# Patient Record
Sex: FEMALE | Race: WHITE | Employment: PART TIME | URBAN - METROPOLITAN AREA
[De-identification: names, ages, dates, MRNs, and addresses within clinical notes are randomized per-mention and may not be internally consistent; named-entity substitution may affect disease eponyms.]

---

## 2017-05-10 ENCOUNTER — HOSPITAL ENCOUNTER (OUTPATIENT)
Dept: OTHER | Age: 27
Discharge: HOME | End: 2017-05-31
Attending: OBSTETRICS & GYNECOLOGY | Admitting: OBSTETRICS & GYNECOLOGY

## 2017-05-22 ENCOUNTER — ROUTINE PRENATAL (OUTPATIENT)
Dept: PERINATAL CARE | Age: 27
End: 2017-05-22

## 2017-05-22 DIAGNOSIS — Z36.89 ENCOUNTER FOR FETAL ANATOMIC SURVEY: ICD-10-CM

## 2017-05-22 DIAGNOSIS — O99.212 OBESITY COMPLICATING PREGNANCY IN SECOND TRIMESTER: ICD-10-CM

## 2017-08-15 ENCOUNTER — ROUTINE PRENATAL (OUTPATIENT)
Dept: PERINATAL CARE | Age: 27
End: 2017-08-15

## 2017-08-15 DIAGNOSIS — O99.213 OBESITY COMPLICATING PREGNANCY IN THIRD TRIMESTER: Primary | ICD-10-CM

## 2017-08-28 ENCOUNTER — ROUTINE PRENATAL (OUTPATIENT)
Dept: PERINATAL CARE | Age: 27
End: 2017-08-28

## 2017-08-28 DIAGNOSIS — O99.213 OBESITY COMPLICATING PREGNANCY IN THIRD TRIMESTER: Primary | ICD-10-CM

## 2017-09-11 ENCOUNTER — TELEPHONE (OUTPATIENT)
Dept: PERINATAL CARE | Age: 27
End: 2017-09-11

## 2017-10-04 PROBLEM — Z98.891 PREVIOUS CESAREAN SECTION: Status: ACTIVE | Noted: 2017-10-04

## 2017-10-04 PROBLEM — Z98.891 S/P CESAREAN SECTION: Status: ACTIVE | Noted: 2017-10-04

## 2018-09-21 ENCOUNTER — HOSPITAL ENCOUNTER (EMERGENCY)
Age: 28
Discharge: HOME | End: 2018-09-21
Attending: EMERGENCY MEDICINE
Payer: COMMERCIAL

## 2018-09-21 ENCOUNTER — APPOINTMENT (OUTPATIENT)
Dept: GENERAL RADIOLOGY | Age: 28
End: 2018-09-21
Payer: COMMERCIAL

## 2018-09-21 VITALS
HEART RATE: 73 BPM | TEMPERATURE: 99.1 F | SYSTOLIC BLOOD PRESSURE: 124 MMHG | BODY MASS INDEX: 37.5 KG/M2 | WEIGHT: 186 LBS | OXYGEN SATURATION: 100 % | DIASTOLIC BLOOD PRESSURE: 75 MMHG | RESPIRATION RATE: 17 BRPM | HEIGHT: 59 IN

## 2018-09-21 DIAGNOSIS — M25.531 RIGHT WRIST PAIN: Primary | ICD-10-CM

## 2018-09-21 PROCEDURE — 99283 EMERGENCY DEPT VISIT LOW MDM: CPT

## 2018-09-21 PROCEDURE — 73110 X-RAY EXAM OF WRIST: CPT

## 2018-09-21 PROCEDURE — 4500000023 HC ED LEVEL 3 PROCEDURE

## 2018-09-21 RX ORDER — IBUPROFEN 600 MG/1
600 TABLET ORAL EVERY 6 HOURS PRN
Qty: 30 TABLET | Refills: 0 | Status: SHIPPED | OUTPATIENT
Start: 2018-09-21 | End: 2019-01-28

## 2018-09-21 ASSESSMENT — PAIN DESCRIPTION - LOCATION: LOCATION: WRIST

## 2018-09-21 ASSESSMENT — PAIN DESCRIPTION - PAIN TYPE: TYPE: ACUTE PAIN

## 2018-09-21 ASSESSMENT — PAIN SCALES - GENERAL: PAINLEVEL_OUTOF10: 6

## 2018-09-21 ASSESSMENT — PAIN DESCRIPTION - ORIENTATION: ORIENTATION: RIGHT

## 2018-09-21 NOTE — ED PROVIDER NOTES
Final Impression      1.  Right wrist pain        (Please note that portions of this note may have been completed with a voice recognition program. Efforts were made to edit the dictations but occasionally words are mis-transcribed.)    Carlee Vazquez, [address] [place] [address],   09/21/18 5559

## 2018-09-22 ENCOUNTER — APPOINTMENT (OUTPATIENT)
Dept: GENERAL RADIOLOGY | Age: 28
End: 2018-09-22
Payer: COMMERCIAL

## 2018-09-22 ENCOUNTER — HOSPITAL ENCOUNTER (EMERGENCY)
Age: 28
Discharge: HOME | End: 2018-09-22
Attending: EMERGENCY MEDICINE
Payer: COMMERCIAL

## 2018-09-22 VITALS
RESPIRATION RATE: 24 BRPM | TEMPERATURE: 97.9 F | BODY MASS INDEX: 37.5 KG/M2 | OXYGEN SATURATION: 99 % | WEIGHT: 186 LBS | SYSTOLIC BLOOD PRESSURE: 129 MMHG | HEART RATE: 81 BPM | DIASTOLIC BLOOD PRESSURE: 84 MMHG | HEIGHT: 59 IN

## 2018-09-22 DIAGNOSIS — S63.501A SPRAIN OF RIGHT WRIST, INITIAL ENCOUNTER: Primary | ICD-10-CM

## 2018-09-22 DIAGNOSIS — S60.221A CONTUSION OF RIGHT HAND, INITIAL ENCOUNTER: ICD-10-CM

## 2018-09-22 PROCEDURE — 73130 X-RAY EXAM OF HAND: CPT

## 2018-09-22 PROCEDURE — L3809 WHFO W/O JOINTS PRE OTS: HCPCS

## 2018-09-22 PROCEDURE — 99283 EMERGENCY DEPT VISIT LOW MDM: CPT

## 2018-09-22 PROCEDURE — 73110 X-RAY EXAM OF WRIST: CPT

## 2018-09-22 ASSESSMENT — ENCOUNTER SYMPTOMS
COLOR CHANGE: 0
EYE REDNESS: 0
SHORTNESS OF BREATH: 0

## 2018-09-22 ASSESSMENT — PAIN DESCRIPTION - LOCATION: LOCATION: WRIST

## 2018-09-22 ASSESSMENT — PAIN DESCRIPTION - DESCRIPTORS: DESCRIPTORS: BURNING

## 2018-09-22 ASSESSMENT — PAIN DESCRIPTION - ORIENTATION: ORIENTATION: RIGHT

## 2018-09-22 ASSESSMENT — PAIN DESCRIPTION - FREQUENCY: FREQUENCY: CONTINUOUS

## 2018-09-22 ASSESSMENT — PAIN DESCRIPTION - PAIN TYPE: TYPE: ACUTE PAIN

## 2018-09-22 ASSESSMENT — PAIN DESCRIPTION - ONSET: ONSET: ON-GOING

## 2018-09-22 ASSESSMENT — PAIN SCALES - GENERAL: PAINLEVEL_OUTOF10: 10

## 2018-09-22 NOTE — ED NOTES
Pt DC home in good condition with velcro wrist splint to R wrist. V/u of Dc instructions. Denies questions or concerns. Teaching done re: s/s to report.      Prabha Park RN  09/22/18 3882

## 2018-09-27 ENCOUNTER — OFFICE VISIT (OUTPATIENT)
Dept: ORTHOPEDIC SURGERY | Age: 28
End: 2018-09-27
Payer: COMMERCIAL

## 2018-09-27 VITALS — BODY MASS INDEX: 36.69 KG/M2 | HEIGHT: 59 IN | WEIGHT: 182 LBS

## 2018-09-27 DIAGNOSIS — S63.501A SPRAIN OF RIGHT WRIST, INITIAL ENCOUNTER: ICD-10-CM

## 2018-09-27 DIAGNOSIS — M25.531 WRIST PAIN, RIGHT: Primary | ICD-10-CM

## 2018-09-27 PROCEDURE — 99203 OFFICE O/P NEW LOW 30 MIN: CPT | Performed by: ORTHOPAEDIC SURGERY

## 2018-09-27 PROCEDURE — G8417 CALC BMI ABV UP PARAM F/U: HCPCS | Performed by: ORTHOPAEDIC SURGERY

## 2018-09-27 PROCEDURE — 1036F TOBACCO NON-USER: CPT | Performed by: ORTHOPAEDIC SURGERY

## 2018-09-27 PROCEDURE — G8427 DOCREV CUR MEDS BY ELIG CLIN: HCPCS | Performed by: ORTHOPAEDIC SURGERY

## 2018-09-27 NOTE — PROGRESS NOTES
erythema or ecchymosis. Skin is intact. Palpation: She has tenderness to palpation over the radial styloid, scaphoid, and CMC joint, less tenderness to palpation over the ulnar styloid, DRUJ    Range of Motion:  Limited secondary to the acute nature of her injury. Strength and special tests are deferred. Skin: There are no rashes, ulcerations or lesions. Reflex: normal    Additional Examinations:  Left Upper Extremity: Examination of the left upper extremity does not show any tenderness, deformity or injury. Range of motion is unremarkable. There is no gross instability. There are no rashes, ulcerations or lesions. Strength and tone are normal.    Cervical spine: The skin is warm and dry. There is no swelling, warmth, or erythema. Range of motion is within normal limits. There is no paraspinal or spinous process tenderness. Spurling's sign is negative and did not produce shoulder pain. The distal neurovascular exam is grossly intact. Additional Comments: Sensation is intact to light touch throughout the median, ulnar and radial nerve distribution. Able to wiggle fingers, give thumbs up, A-OK and cross index and middle fingers. The patient has warm and well-perfused bilateral upper extremities with brisk capillary refill. Adolfos test is normal    X-RAYS:  3 views of the right wrist are obtained and reviewed. There is no periosteal reaction, medullary lesions, or osteopenia. Joint spaces are well maintained. No evidence of fracture or dislocation, no evidence of soft tissue swelling. Assessment:  Right wrist sprain    Impression:  Encounter Diagnoses   Name Primary?  Wrist pain, right Yes    Sprain of right wrist, initial encounter          Treatment Plan:  She will continue with conservative treatment for a right wrist sprain, remain in the thumb spica splint,, about 2-3 times a day for gentle range of motion and ice. Continue anti-inflammatories as needed for discomfort.   Follow-up in one month for reevaluation. We did provide a note stating she will have work restrictions and limited use of the right arm for the next month. Patient agrees with this plan, all of their questions were answered best of our ability and to their satisfaction. Office Procedures:  Orders Placed This Encounter   Procedures    XR WRIST RIGHT (MIN 3 VIEWS)     3V R Wrist     Order Specific Question:   Reason for exam:     Answer:   Wrist Pain     No orders of the defined types were placed in this encounter. Jayson Petite      This dictation was performed with a verbal recognition program (DRAGON) and it was checked for errors. It is possible that there are still dictated errors within this office note. If so, please bring any errors to my attention for an addendum. All efforts were made to ensure that this office note is accurate.

## 2019-01-28 ENCOUNTER — HOSPITAL ENCOUNTER (EMERGENCY)
Age: 29
Discharge: HOME | End: 2019-01-28
Attending: EMERGENCY MEDICINE
Payer: COMMERCIAL

## 2019-01-28 VITALS
BODY MASS INDEX: 38.41 KG/M2 | TEMPERATURE: 98.2 F | HEART RATE: 95 BPM | HEIGHT: 58 IN | WEIGHT: 183 LBS | OXYGEN SATURATION: 98 % | RESPIRATION RATE: 18 BRPM

## 2019-01-28 DIAGNOSIS — L02.01 ABSCESS OF FACE: Primary | ICD-10-CM

## 2019-01-28 PROCEDURE — 99282 EMERGENCY DEPT VISIT SF MDM: CPT

## 2019-01-28 PROCEDURE — 6370000000 HC RX 637 (ALT 250 FOR IP): Performed by: EMERGENCY MEDICINE

## 2019-01-28 RX ORDER — ACETAMINOPHEN 500 MG
1000 TABLET ORAL ONCE
Status: COMPLETED | OUTPATIENT
Start: 2019-01-28 | End: 2019-01-28

## 2019-01-28 RX ORDER — IBUPROFEN 400 MG/1
400 TABLET ORAL EVERY 6 HOURS PRN
Qty: 30 TABLET | Refills: 0 | Status: SHIPPED | OUTPATIENT
Start: 2019-01-28 | End: 2019-07-17

## 2019-01-28 RX ORDER — IBUPROFEN 400 MG/1
400 TABLET ORAL ONCE
Status: COMPLETED | OUTPATIENT
Start: 2019-01-28 | End: 2019-01-28

## 2019-01-28 RX ORDER — SULFAMETHOXAZOLE AND TRIMETHOPRIM 400; 80 MG/1; MG/1
1 TABLET ORAL 2 TIMES DAILY
COMMUNITY
End: 2019-07-17

## 2019-01-28 RX ADMIN — ACETAMINOPHEN 1000 MG: 500 TABLET ORAL at 23:13

## 2019-01-28 RX ADMIN — IBUPROFEN 400 MG: 400 TABLET ORAL at 23:13

## 2019-01-28 ASSESSMENT — PAIN DESCRIPTION - LOCATION: LOCATION: FACE

## 2019-01-28 ASSESSMENT — PAIN DESCRIPTION - ORIENTATION: ORIENTATION: RIGHT

## 2019-01-28 ASSESSMENT — PAIN SCALES - GENERAL
PAINLEVEL_OUTOF10: 8
PAINLEVEL_OUTOF10: 8

## 2019-01-28 ASSESSMENT — PAIN DESCRIPTION - ONSET: ONSET: PROGRESSIVE

## 2019-01-28 ASSESSMENT — PAIN DESCRIPTION - DESCRIPTORS: DESCRIPTORS: PRESSURE

## 2019-01-28 ASSESSMENT — PAIN DESCRIPTION - FREQUENCY: FREQUENCY: CONTINUOUS

## 2019-01-30 ENCOUNTER — HOSPITAL ENCOUNTER (EMERGENCY)
Age: 29
Discharge: HOME | End: 2019-01-30
Attending: EMERGENCY MEDICINE
Payer: COMMERCIAL

## 2019-01-30 VITALS
SYSTOLIC BLOOD PRESSURE: 136 MMHG | TEMPERATURE: 98.1 F | BODY MASS INDEX: 38.41 KG/M2 | RESPIRATION RATE: 14 BRPM | DIASTOLIC BLOOD PRESSURE: 78 MMHG | HEIGHT: 58 IN | WEIGHT: 183 LBS | OXYGEN SATURATION: 97 % | HEART RATE: 86 BPM

## 2019-01-30 DIAGNOSIS — L02.01 FACIAL ABSCESS: Primary | ICD-10-CM

## 2019-01-30 PROCEDURE — 4500000022 HC ED LEVEL 2 PROCEDURE

## 2019-01-30 PROCEDURE — 99282 EMERGENCY DEPT VISIT SF MDM: CPT

## 2019-01-30 ASSESSMENT — PAIN DESCRIPTION - ORIENTATION: ORIENTATION: RIGHT

## 2019-01-30 ASSESSMENT — PAIN DESCRIPTION - PAIN TYPE: TYPE: ACUTE PAIN

## 2019-01-30 ASSESSMENT — PAIN SCALES - GENERAL: PAINLEVEL_OUTOF10: 8

## 2019-01-30 ASSESSMENT — PAIN DESCRIPTION - FREQUENCY: FREQUENCY: CONTINUOUS

## 2019-01-30 ASSESSMENT — PAIN DESCRIPTION - DESCRIPTORS: DESCRIPTORS: SHARP;SHOOTING;CONSTANT;PRESSURE

## 2019-07-05 ENCOUNTER — HOSPITAL ENCOUNTER (EMERGENCY)
Age: 29
Discharge: HOME | End: 2019-07-05
Attending: EMERGENCY MEDICINE
Payer: COMMERCIAL

## 2019-07-05 ENCOUNTER — APPOINTMENT (OUTPATIENT)
Dept: GENERAL RADIOLOGY | Age: 29
End: 2019-07-05
Payer: COMMERCIAL

## 2019-07-05 VITALS
WEIGHT: 180 LBS | RESPIRATION RATE: 14 BRPM | HEIGHT: 58 IN | TEMPERATURE: 98.3 F | OXYGEN SATURATION: 98 % | BODY MASS INDEX: 37.79 KG/M2 | DIASTOLIC BLOOD PRESSURE: 90 MMHG | HEART RATE: 82 BPM | SYSTOLIC BLOOD PRESSURE: 143 MMHG

## 2019-07-05 DIAGNOSIS — S63.501A SPRAIN OF RIGHT WRIST, INITIAL ENCOUNTER: Primary | ICD-10-CM

## 2019-07-05 DIAGNOSIS — I10 ESSENTIAL HYPERTENSION: ICD-10-CM

## 2019-07-05 PROCEDURE — 73110 X-RAY EXAM OF WRIST: CPT

## 2019-07-05 PROCEDURE — 6370000000 HC RX 637 (ALT 250 FOR IP): Performed by: EMERGENCY MEDICINE

## 2019-07-05 PROCEDURE — 99283 EMERGENCY DEPT VISIT LOW MDM: CPT

## 2019-07-05 RX ORDER — IBUPROFEN 600 MG/1
600 TABLET ORAL ONCE
Status: COMPLETED | OUTPATIENT
Start: 2019-07-05 | End: 2019-07-05

## 2019-07-05 RX ADMIN — IBUPROFEN 600 MG: 600 TABLET, FILM COATED ORAL at 23:18

## 2019-07-05 ASSESSMENT — ENCOUNTER SYMPTOMS
ABDOMINAL PAIN: 0
DIARRHEA: 0
VOMITING: 0
NAUSEA: 0
BACK PAIN: 0

## 2019-07-05 ASSESSMENT — PAIN SCALES - GENERAL
PAINLEVEL_OUTOF10: 8
PAINLEVEL_OUTOF10: 8

## 2019-07-05 ASSESSMENT — PAIN DESCRIPTION - DESCRIPTORS: DESCRIPTORS: ACHING;TINGLING;SHARP

## 2019-07-05 ASSESSMENT — PAIN DESCRIPTION - ORIENTATION: ORIENTATION: RIGHT

## 2019-07-05 ASSESSMENT — PAIN DESCRIPTION - LOCATION: LOCATION: WRIST

## 2019-07-05 ASSESSMENT — PAIN DESCRIPTION - FREQUENCY: FREQUENCY: INTERMITTENT

## 2019-07-05 ASSESSMENT — PAIN DESCRIPTION - PAIN TYPE: TYPE: ACUTE PAIN

## 2019-07-05 ASSESSMENT — PAIN DESCRIPTION - PROGRESSION: CLINICAL_PROGRESSION: GRADUALLY WORSENING

## 2019-07-06 NOTE — ED PROVIDER NOTES
given the following medications:  Medications   ibuprofen (ADVIL;MOTRIN) tablet 600 mg (600 mg Oral Given 7/5/19 5117)     Imaging was obtained that does not show acute abnormality. Despite this the patient is still having pain in the anatomic snuffbox. I think it is reasonable to place the patient in a Velcro wrist splint. I want her to follow-up closely with primary care as she may require additional imaging in a week. Patient is agreeable with this plan. I estimate there is LOW risk for COMPARTMENT SYNDROME, DEEP VENOUS THROMBOSIS, SEPTIC ARTHRITIS, TENDON OR NEUROVASCULAR INJURY, thus I consider the discharge disposition reasonable. Maureen Banks and I have discussed the diagnosis and risks, and we agree with discharging home to follow-up with their primary doctor or the referral orthopedist. We also discussed returning to the Emergency Department immediately if new or worsening symptoms occur. We have discussed the symptoms which are most concerning (e.g., changing or worsening pain, numbness, weakness) that necessitate immediate return. The patient understands the importance of follow up and reasons to return. FINAL IMPRESSION      1. Sprain of right wrist, initial encounter    2. Essential hypertension          DISPOSITION/PLAN   DISPOSITION Decision To Discharge 07/05/2019 11:33:55 PM      PATIENT REFERRED TO:  Andrea Billy, APRN - CNP  [address]  [phone]    Schedule an appointment as soon as possible for a visit in 1 week      [facility] Department  [address] [place] [address],[address]  [phone]  Go to   If symptoms worsen      DISCHARGE MEDICATIONS:  Discharge Medication List as of 7/5/2019 11:35 PM          DISCONTINUED MEDICATIONS:  Discharge Medication List as of 7/5/2019 11:35 PM                 (Please note that portions of this note were completed with a voice recognition program.  Efforts were [facility] edit the dictations but occasionally words are mis-transcribed.)    Hawa Urbina MD(electronically signed)              Hawa Urbina MD  07/06/19 2514

## 2019-07-17 ENCOUNTER — APPOINTMENT (OUTPATIENT)
Dept: CT IMAGING | Age: 29
End: 2019-07-17
Payer: COMMERCIAL

## 2019-07-17 ENCOUNTER — HOSPITAL ENCOUNTER (EMERGENCY)
Age: 29
Discharge: HOME | End: 2019-07-17
Attending: EMERGENCY MEDICINE
Payer: COMMERCIAL

## 2019-07-17 VITALS
HEIGHT: 59 IN | HEART RATE: 86 BPM | SYSTOLIC BLOOD PRESSURE: 112 MMHG | TEMPERATURE: 97.9 F | RESPIRATION RATE: 16 BRPM | WEIGHT: 180 LBS | BODY MASS INDEX: 36.29 KG/M2 | DIASTOLIC BLOOD PRESSURE: 62 MMHG | OXYGEN SATURATION: 99 %

## 2019-07-17 DIAGNOSIS — R10.13 ABDOMINAL PAIN, EPIGASTRIC: Primary | ICD-10-CM

## 2019-07-17 LAB
A/G RATIO: 1.2 (ref 1.1–2.2)
ALBUMIN SERPL-MCNC: 4.3 G/DL (ref 3.4–5)
ALP BLD-CCNC: 76 U/L (ref 40–129)
ALT SERPL-CCNC: 11 U/L (ref 10–40)
ANION GAP SERPL CALCULATED.3IONS-SCNC: 12 MMOL/L (ref 3–16)
AST SERPL-CCNC: 13 U/L (ref 15–37)
BASOPHILS ABSOLUTE: 0 K/UL (ref 0–0.2)
BASOPHILS RELATIVE PERCENT: 0.2 %
BILIRUB SERPL-MCNC: <0.2 MG/DL (ref 0–1)
BILIRUBIN URINE: NEGATIVE
BLOOD, URINE: NEGATIVE
BUN BLDV-MCNC: 17 MG/DL (ref 7–20)
CALCIUM SERPL-MCNC: 9 MG/DL (ref 8.3–10.6)
CHLORIDE BLD-SCNC: 100 MMOL/L (ref 99–110)
CLARITY: ABNORMAL
CO2: 23 MMOL/L (ref 21–32)
COLOR: YELLOW
CREAT SERPL-MCNC: <0.5 MG/DL (ref 0.6–1.1)
EOSINOPHILS ABSOLUTE: 0.1 K/UL (ref 0–0.6)
EOSINOPHILS RELATIVE PERCENT: 0.8 %
GFR AFRICAN AMERICAN: >60
GFR NON-AFRICAN AMERICAN: >60
GLOBULIN: 3.6 G/DL
GLUCOSE BLD-MCNC: 122 MG/DL (ref 70–99)
GLUCOSE URINE: NEGATIVE MG/DL
HCG QUALITATIVE: NEGATIVE
HCT VFR BLD CALC: 34.8 % (ref 36–48)
HEMOGLOBIN: 11.1 G/DL (ref 12–16)
KETONES, URINE: NEGATIVE MG/DL
LEUKOCYTE ESTERASE, URINE: NEGATIVE
LIPASE: 25 U/L (ref 13–60)
LYMPHOCYTES ABSOLUTE: 1.4 K/UL (ref 1–5.1)
LYMPHOCYTES RELATIVE PERCENT: 11.9 %
MCH RBC QN AUTO: 24 PG (ref 26–34)
MCHC RBC AUTO-ENTMCNC: 32 G/DL (ref 31–36)
MCV RBC AUTO: 75.1 FL (ref 80–100)
MICROSCOPIC EXAMINATION: ABNORMAL
MONOCYTES ABSOLUTE: 0.8 K/UL (ref 0–1.3)
MONOCYTES RELATIVE PERCENT: 6.7 %
NEUTROPHILS ABSOLUTE: 9.7 K/UL (ref 1.7–7.7)
NEUTROPHILS RELATIVE PERCENT: 80.4 %
NITRITE, URINE: NEGATIVE
PDW BLD-RTO: 16.9 % (ref 12.4–15.4)
PH UA: 5.5 (ref 5–8)
PLATELET # BLD: 308 K/UL (ref 135–450)
PMV BLD AUTO: 7.7 FL (ref 5–10.5)
POTASSIUM REFLEX MAGNESIUM: 3.8 MMOL/L (ref 3.5–5.1)
PROTEIN UA: NEGATIVE MG/DL
RBC # BLD: 4.64 M/UL (ref 4–5.2)
SODIUM BLD-SCNC: 135 MMOL/L (ref 136–145)
SPECIFIC GRAVITY UA: >=1.03 (ref 1–1.03)
TOTAL PROTEIN: 7.9 G/DL (ref 6.4–8.2)
URINE REFLEX TO CULTURE: ABNORMAL
URINE TYPE: ABNORMAL
UROBILINOGEN, URINE: 0.2 E.U./DL
WBC # BLD: 12 K/UL (ref 4–11)

## 2019-07-17 PROCEDURE — 85025 COMPLETE CBC W/AUTO DIFF WBC: CPT

## 2019-07-17 PROCEDURE — 99284 EMERGENCY DEPT VISIT MOD MDM: CPT

## 2019-07-17 PROCEDURE — 80053 COMPREHEN METABOLIC PANEL: CPT

## 2019-07-17 PROCEDURE — 2580000003 HC RX 258: Performed by: EMERGENCY MEDICINE

## 2019-07-17 PROCEDURE — 81003 URINALYSIS AUTO W/O SCOPE: CPT

## 2019-07-17 PROCEDURE — 6370000000 HC RX 637 (ALT 250 FOR IP): Performed by: EMERGENCY MEDICINE

## 2019-07-17 PROCEDURE — 6360000002 HC RX W HCPCS: Performed by: EMERGENCY MEDICINE

## 2019-07-17 PROCEDURE — 74177 CT ABD & PELVIS W/CONTRAST: CPT

## 2019-07-17 PROCEDURE — 83690 ASSAY OF LIPASE: CPT

## 2019-07-17 PROCEDURE — 6360000004 HC RX CONTRAST MEDICATION: Performed by: EMERGENCY MEDICINE

## 2019-07-17 PROCEDURE — 84703 CHORIONIC GONADOTROPIN ASSAY: CPT

## 2019-07-17 PROCEDURE — 96375 TX/PRO/DX INJ NEW DRUG ADDON: CPT

## 2019-07-17 PROCEDURE — 96361 HYDRATE IV INFUSION ADD-ON: CPT

## 2019-07-17 PROCEDURE — 96374 THER/PROPH/DIAG INJ IV PUSH: CPT

## 2019-07-17 RX ORDER — MORPHINE SULFATE 4 MG/ML
4 INJECTION, SOLUTION INTRAMUSCULAR; INTRAVENOUS ONCE
Status: COMPLETED | OUTPATIENT
Start: 2019-07-17 | End: 2019-07-17

## 2019-07-17 RX ORDER — SUCRALFATE 1 G/1
1 TABLET ORAL 4 TIMES DAILY
Qty: 60 TABLET | Refills: 0 | Status: SHIPPED | OUTPATIENT
Start: 2019-07-17 | End: 2021-02-04

## 2019-07-17 RX ORDER — FAMOTIDINE 20 MG/1
20 TABLET, FILM COATED ORAL 2 TIMES DAILY
Qty: 60 TABLET | Refills: 0 | Status: SHIPPED | OUTPATIENT
Start: 2019-07-17 | End: 2021-02-04

## 2019-07-17 RX ORDER — FAMOTIDINE 20 MG/1
20 TABLET, FILM COATED ORAL ONCE
Status: COMPLETED | OUTPATIENT
Start: 2019-07-17 | End: 2019-07-17

## 2019-07-17 RX ORDER — ONDANSETRON 2 MG/ML
4 INJECTION INTRAMUSCULAR; INTRAVENOUS ONCE
Status: COMPLETED | OUTPATIENT
Start: 2019-07-17 | End: 2019-07-17

## 2019-07-17 RX ORDER — 0.9 % SODIUM CHLORIDE 0.9 %
1000 INTRAVENOUS SOLUTION INTRAVENOUS ONCE
Status: COMPLETED | OUTPATIENT
Start: 2019-07-17 | End: 2019-07-17

## 2019-07-17 RX ORDER — KETOROLAC TROMETHAMINE 30 MG/ML
30 INJECTION, SOLUTION INTRAMUSCULAR; INTRAVENOUS ONCE
Status: DISCONTINUED | OUTPATIENT
Start: 2019-07-17 | End: 2019-07-17

## 2019-07-17 RX ADMIN — LIDOCAINE HYDROCHLORIDE: 20 SOLUTION ORAL; TOPICAL at 09:53

## 2019-07-17 RX ADMIN — SODIUM CHLORIDE 1000 ML: 9 INJECTION, SOLUTION INTRAVENOUS at 08:02

## 2019-07-17 RX ADMIN — ONDANSETRON 4 MG: 2 INJECTION INTRAMUSCULAR; INTRAVENOUS at 08:02

## 2019-07-17 RX ADMIN — IOPAMIDOL 75 ML: 755 INJECTION, SOLUTION INTRAVENOUS at 09:21

## 2019-07-17 RX ADMIN — FAMOTIDINE 20 MG: 20 TABLET ORAL at 09:53

## 2019-07-17 RX ADMIN — MORPHINE SULFATE 4 MG: 4 INJECTION INTRAVENOUS at 08:17

## 2019-07-17 ASSESSMENT — PAIN DESCRIPTION - DESCRIPTORS
DESCRIPTORS: SHARP
DESCRIPTORS: DULL

## 2019-07-17 ASSESSMENT — PAIN SCALES - GENERAL
PAINLEVEL_OUTOF10: 6
PAINLEVEL_OUTOF10: 10

## 2019-07-17 ASSESSMENT — PAIN DESCRIPTION - LOCATION: LOCATION: ABDOMEN

## 2019-07-17 NOTE — ED PROVIDER NOTES
medications on file       ALLERGIES     Toradol [ketorolac tromethamine];  Tramadol; and Vicodin [hydrocodone-acetaminophen]    FAMILY HISTORY       Family History   Problem Relation Age of Onset    Asthma Mother     Miscarriages / [place] Mother           SOCIAL HISTORY       Social History     Socioeconomic History    Marital status: Single     Spouse name: None    Number of children: 5    Years of education: None    Highest education level: None   Occupational History    None   Social Needs    Financial resource strain: None    Food insecurity:     Worry: None     Inability: None    Transportation needs:     Medical: None     Non-medical: None   Tobacco Use    Smoking status: Never Smoker    Smokeless tobacco: Never Used   Substance and Sexual Activity    Alcohol use: No    Drug use: No    Sexual activity: Yes     Partners: Male   Lifestyle    Physical activity:     Days per week: None     Minutes per session: None    Stress: None   Relationships    Social connections:     Talks on phone: None     Gets together: None     Attends Jainism service: None     Active member of club or organization: None     Attends meetings of clubs or organizations: None     Relationship status: None    Intimate partner violence:     Fear of current or ex partner: None     Emotionally abused: None     Physically abused: None     Forced sexual activity: None   Other Topics Concern    None   Social History Narrative    None       SCREENINGS    Ian Coma Scale  Eye Opening: Spontaneous  Best Verbal Response: Oriented  Best Motor Response: Obeys commands  Ian Coma Scale Score: 15          Review of Systems  Constitutional:  Denies fever, chills  Eyes: denies eye problems  HEENT: denies sore throat or ear pain  Respiratory: denies cough or shortness of breath  Cardiovascular: denies chest pain, palpitations  GI: Reports epigastric abdominal pain with nausea and vomiting  Musculoskeletal: denies joint

## 2019-10-23 ENCOUNTER — APPOINTMENT (OUTPATIENT)
Dept: GENERAL RADIOLOGY | Age: 29
End: 2019-10-23
Payer: COMMERCIAL

## 2019-10-23 ENCOUNTER — HOSPITAL ENCOUNTER (EMERGENCY)
Age: 29
Discharge: HOME | End: 2019-10-23
Attending: EMERGENCY MEDICINE
Payer: COMMERCIAL

## 2019-10-23 VITALS
TEMPERATURE: 98.1 F | RESPIRATION RATE: 16 BRPM | BODY MASS INDEX: 39.88 KG/M2 | SYSTOLIC BLOOD PRESSURE: 130 MMHG | HEIGHT: 58 IN | OXYGEN SATURATION: 99 % | DIASTOLIC BLOOD PRESSURE: 85 MMHG | WEIGHT: 190 LBS | HEART RATE: 77 BPM

## 2019-10-23 DIAGNOSIS — S63.642A SPRAIN OF METACARPOPHALANGEAL (MCP) JOINT OF LEFT THUMB, INITIAL ENCOUNTER: Primary | ICD-10-CM

## 2019-10-23 PROCEDURE — 73130 X-RAY EXAM OF HAND: CPT

## 2019-10-23 PROCEDURE — 6370000000 HC RX 637 (ALT 250 FOR IP): Performed by: EMERGENCY MEDICINE

## 2019-10-23 PROCEDURE — 99283 EMERGENCY DEPT VISIT LOW MDM: CPT

## 2019-10-23 RX ORDER — ACETAMINOPHEN 500 MG
1000 TABLET ORAL ONCE
Status: COMPLETED | OUTPATIENT
Start: 2019-10-23 | End: 2019-10-23

## 2019-10-23 RX ADMIN — ACETAMINOPHEN 1000 MG: 500 TABLET ORAL at 23:03

## 2019-10-23 ASSESSMENT — PAIN SCALES - GENERAL
PAINLEVEL_OUTOF10: 8
PAINLEVEL_OUTOF10: 8

## 2020-07-31 ENCOUNTER — HOSPITAL ENCOUNTER (EMERGENCY)
Age: 30
Discharge: HOME | End: 2020-08-01
Payer: COMMERCIAL

## 2020-07-31 PROCEDURE — 99282 EMERGENCY DEPT VISIT SF MDM: CPT

## 2020-07-31 RX ORDER — PREDNISONE 20 MG/1
40 TABLET ORAL DAILY
Qty: 8 TABLET | Refills: 0 | Status: SHIPPED | OUTPATIENT
Start: 2020-07-31 | End: 2020-08-04

## 2020-07-31 RX ORDER — METHOCARBAMOL 500 MG/1
500 TABLET, FILM COATED ORAL 4 TIMES DAILY
Qty: 15 TABLET | Refills: 0 | Status: SHIPPED | OUTPATIENT
Start: 2020-07-31 | End: 2020-08-04

## 2020-07-31 RX ORDER — PREDNISONE 20 MG/1
40 TABLET ORAL ONCE
Status: COMPLETED | OUTPATIENT
Start: 2020-08-01 | End: 2020-08-01

## 2020-07-31 ASSESSMENT — PAIN DESCRIPTION - LOCATION: LOCATION: LEG

## 2020-07-31 ASSESSMENT — PAIN SCALES - GENERAL: PAINLEVEL_OUTOF10: 8

## 2020-07-31 ASSESSMENT — PAIN DESCRIPTION - ORIENTATION: ORIENTATION: LEFT

## 2020-07-31 ASSESSMENT — PAIN DESCRIPTION - DESCRIPTORS: DESCRIPTORS: ACHING

## 2020-07-31 ASSESSMENT — PAIN DESCRIPTION - PAIN TYPE: TYPE: ACUTE PAIN

## 2020-08-01 VITALS
WEIGHT: 193 LBS | SYSTOLIC BLOOD PRESSURE: 122 MMHG | RESPIRATION RATE: 18 BRPM | HEIGHT: 58 IN | DIASTOLIC BLOOD PRESSURE: 74 MMHG | BODY MASS INDEX: 40.51 KG/M2 | HEART RATE: 74 BPM | OXYGEN SATURATION: 100 % | TEMPERATURE: 98.1 F

## 2020-08-01 PROCEDURE — 6370000000 HC RX 637 (ALT 250 FOR IP): Performed by: PHYSICIAN ASSISTANT

## 2020-08-01 RX ADMIN — PREDNISONE 40 MG: 20 TABLET ORAL at 00:08

## 2020-08-01 ASSESSMENT — PAIN SCALES - GENERAL: PAINLEVEL_OUTOF10: 7

## 2020-08-01 ASSESSMENT — ENCOUNTER SYMPTOMS
ABDOMINAL SWELLING: 0
COLOR CHANGE: 0
BOWEL INCONTINENCE: 0
VOMITING: 0
BACK PAIN: 1
SHORTNESS OF BREATH: 0
ABDOMINAL PAIN: 0

## 2020-08-01 NOTE — ED PROVIDER NOTES
[facility] [facility] Name: Kim May  MRN: [id number]  [place] 1990  Date of evaluation: 7/31/2020  Provider: Hetal Suarez PA-C  PCP: ACE Inman CNP    Shared Visit or Autonomous Visit: Evaluation by MORRIS. My supervising physician was available for consultation. CHIEF COMPLAINT       Chief Complaint   Patient presents with    Leg Pain     left posterior thight pain from buttock to knee after stepping out of vehicle Thursday evening. HISTORY OF PRESENT ILLNESS   (Location/Symptom, Timing/Onset, Context/Setting, Quality, Duration, Modifying Factors, Severity)  Note limiting factors. Kim May is a 30 y.o. female presented to the emergency department with complaint of pain down the back of her left leg since yesterday evening states she had stepped out of the vehicle and suddenly got up pain that went from her buttock down her left leg to her heel states felt like her heel was broken. There was no trauma no falls. States she always has some back pain no worse than usual.  History of sciatica during her prior pregnancy. Pain is worse with position change and movement. Pain is from buttock down the back of her leg. No leg swelling. No erythema. No rashes. No numbness or weakness of extremities. No bowel or bladder dysfunction. No saddle anesthesia. No abdominal pain. No fever or vomiting. No dysuria or hematuria. States has tried resting and taking ibuprofen today without relief she is supposed to work tomorrow came in for evaluation. The history is provided by the patient. Back Pain   Location:  Lumbar spine and gluteal region  Quality:  Shooting  Radiates to:  L thigh and L foot  Onset quality:  Sudden  Duration:  2 days  Chronicity:  New  Context: not falling    Worsened by:   Movement, palpation and bending  Associated symptoms: no abdominal pain, no abdominal swelling, no bladder incontinence, no bowel incontinence, no chest pain, no dysuria, no fever, no numbness, no perianal numbness and no weakness          Nursing Notes were reviewed    REVIEW OF SYSTEMS    (2-9 systems for level 4, 10 or more for level 5)     Review of Systems   Constitutional: Negative for chills and fever. Respiratory: Negative for shortness of breath. Cardiovascular: Negative for chest pain. Gastrointestinal: Negative for abdominal pain, bowel incontinence and vomiting. Genitourinary: Negative for bladder incontinence, difficulty urinating and dysuria. Musculoskeletal: Positive for back pain. Left leg pain   Skin: Negative for color change, rash and wound. Neurological: Negative for weakness and numbness. Positives and Pertinent negatives as per HPI. PAST MEDICAL HISTORY     Past Medical History:   Diagnosis Date    Abnormal Pap smear of cervix     normal since    Anemia     after previous     Chlamydia     Remote    Gestational diabetes     MRSA (methicillin resistant staph aureus) culture positive 10/20/2017    C section  - abdomen    Wrist fracture age 8    right wrist; fell off [employer]         SURGICAL HISTORY     Past Surgical History:   Procedure Laterality Date     SECTION, LOW TRANSVERSE  , , , ,     hx 5 C/S    CHOLECYSTECTOMY  2009    TUBAL LIGATION      WISDOM TOOTH EXTRACTION           CURRENTMEDICATIONS       Previous Medications    FAMOTIDINE (PEPCID) 20 MG TABLET    Take 1 tablet by mouth 2 times daily    SUCRALFATE (CARAFATE) 1 GM TABLET    Take 1 tablet by mouth 4 times daily         ALLERGIES     Toradol [ketorolac tromethamine];  Tramadol; and Vicodin [hydrocodone-acetaminophen]    FAMILYHISTORY       Family History   Problem Relation Age of Onset    Asthma Mother     Miscarriages / [place] Mother           SOCIAL HISTORY       Social History     Socioeconomic History    Marital status: Single     Spouse name: None    Number of children: 5    Years of education: None    Highest education level: None   Occupational History    None   Social Needs    Financial resource strain: None    Food insecurity     Worry: None     Inability: None    Transportation needs     Medical: None     Non-medical: None   Tobacco Use    Smoking status: Never Smoker    Smokeless tobacco: Never Used   Substance and Sexual Activity    Alcohol use: No    Drug use: No    Sexual activity: Yes     Partners: Male   Lifestyle    Physical activity     Days per week: None     Minutes per session: None    Stress: None   Relationships    Social connections     Talks on phone: None     Gets together: None     Attends Amish service: None     Active member of club or organization: None     Attends meetings of clubs or organizations: None     Relationship status: None    Intimate partner violence     Fear of current or ex partner: None     Emotionally abused: None     Physically abused: None     Forced sexual activity: None   Other Topics Concern    None   Social History Narrative    None       SCREENINGS             PHYSICAL EXAM    (up to 7 for level 4, 8 or more for level 5)     ED Triage Vitals [07/31/20 2338]   BP Temp Temp Source Pulse Resp SpO2 Height Weight   126/70 98.1 °F (36.7 °C) Oral 77 18 100 % 4' 10\" (1.473 m) 193 lb (87.5 kg)       Physical Exam  Vitals signs and nursing note reviewed. Constitutional:       Appearance: She is well-developed. She is not ill-appearing or toxic-appearing. HENT:      Head: Normocephalic and atraumatic. Mouth/Throat:      Mouth: Mucous membranes are moist.      Pharynx: Oropharynx is clear. No posterior oropharyngeal erythema. Eyes:      Conjunctiva/sclera: Conjunctivae normal.   Cardiovascular:      Rate and Rhythm: Normal rate and regular rhythm. Pulses: Normal pulses. Dorsalis pedis pulses are 2+ on the left side.         Posterior tibial pulses are 2+ on the right side findings:        Interpretation [facility] [place]note 2[place] Radiologist below, if available at the time of this note:    No orders to display     No results found. PROCEDURES   Unless otherwise noted below, none     Procedures    CRITICAL CARE TIME   N/A    CONSULTS:  None      EMERGENCY DEPARTMENT COURSE and DIFFERENTIAL DIAGNOSIS/MDM:   Vitals:    Vitals:    07/31/20 2338   BP: 126/70   Pulse: 77   Resp: 18   Temp: 98.1 °F (36.7 °C)   TempSrc: Oral   SpO2: 100%   Weight: 193 lb (87.5 kg)   Height: 4' 10\" (1.473 m)       Patient was given thefollowing medications:  Medications   predniSONE (DELTASONE) tablet 40 mg (has no administration in time range)       12:05 AM EDT  Patient symptoms and exam consistent with sciatica. Treatment with prednisone muscle relaxer. Advised to rest all for the next 3 days. May alternate ice and heat. Advise close follow-up with her doctor for further evaluation and we discussed if symptoms not improving may need MRI to further evaluate she understands. We discussed returning to the ER for any worsening symptoms. I estimate there is LOW risk for ABDOMINAL AORTIC ANEURYSM, CAUDA EQUINA SYNDROME, EPIDURAL MASS LESION, OR CORD COMPRESSION, thus I consider the discharge disposition reasonable. FINAL IMPRESSION      1.  Left sided sciatica          DISPOSITION/PLAN   DISPOSITION Discharge - Pending Orders Complete    PATIENT REFERREDTO:  ACE Luna - [facility]  [address] [facility]  [phone]    In 3 days  If symptoms worsen      DISCHARGE MEDICATIONS:  New Prescriptions    METHOCARBAMOL (ROBAXIN) 500 MG TABLET    Take 1 tablet by mouth 4 times daily for 15 doses    PREDNISONE (DELTASONE) 20 MG TABLET    Take 2 tablets by mouth daily for 4 days       DISCONTINUED MEDICATIONS:  Discontinued Medications    No medications on file              (Please note that portions ofthis note were completed with a voice recognition program.  Efforts were made to edit the dictations but occasionally words are mis-transcribed.)    Renu Mckeon PA-C (electronically signed)           Chela Armenta PA-C  08/01/20 0006

## 2021-02-04 ENCOUNTER — HOSPITAL ENCOUNTER (EMERGENCY)
Age: 31
Discharge: HOME | End: 2021-02-04
Attending: EMERGENCY MEDICINE
Payer: COMMERCIAL

## 2021-02-04 VITALS
WEIGHT: 190 LBS | BODY MASS INDEX: 38.3 KG/M2 | DIASTOLIC BLOOD PRESSURE: 77 MMHG | OXYGEN SATURATION: 100 % | HEART RATE: 87 BPM | SYSTOLIC BLOOD PRESSURE: 121 MMHG | HEIGHT: 59 IN | RESPIRATION RATE: 14 BRPM | TEMPERATURE: 98.3 F

## 2021-02-04 DIAGNOSIS — M54.32 SCIATICA OF LEFT SIDE: ICD-10-CM

## 2021-02-04 DIAGNOSIS — M25.552 LEFT HIP PAIN: Primary | ICD-10-CM

## 2021-02-04 DIAGNOSIS — M54.42 ACUTE LEFT-SIDED LOW BACK PAIN WITH LEFT-SIDED SCIATICA: ICD-10-CM

## 2021-02-04 PROCEDURE — 6370000000 HC RX 637 (ALT 250 FOR IP): Performed by: EMERGENCY MEDICINE

## 2021-02-04 PROCEDURE — 99284 EMERGENCY DEPT VISIT MOD MDM: CPT

## 2021-02-04 RX ORDER — OXYCODONE HYDROCHLORIDE AND ACETAMINOPHEN 5; 325 MG/1; MG/1
1 TABLET ORAL EVERY 6 HOURS PRN
Qty: 20 TABLET | Refills: 0 | Status: SHIPPED | OUTPATIENT
Start: 2021-02-04 | End: 2021-02-09

## 2021-02-04 RX ORDER — PREDNISONE 20 MG/1
20 TABLET ORAL ONCE
Status: COMPLETED | OUTPATIENT
Start: 2021-02-04 | End: 2021-02-04

## 2021-02-04 RX ORDER — METHYLPREDNISOLONE 4 MG/1
TABLET ORAL
Qty: 1 KIT | Refills: 0 | Status: SHIPPED | OUTPATIENT
Start: 2021-02-04 | End: 2021-09-21

## 2021-02-04 RX ADMIN — PREDNISONE 20 MG: 20 TABLET ORAL at 23:19

## 2021-02-04 ASSESSMENT — PAIN DESCRIPTION - PAIN TYPE: TYPE: ACUTE PAIN

## 2021-02-04 ASSESSMENT — PAIN SCALES - GENERAL: PAINLEVEL_OUTOF10: 8

## 2021-02-04 ASSESSMENT — PAIN DESCRIPTION - ORIENTATION: ORIENTATION: LEFT;POSTERIOR

## 2021-02-04 ASSESSMENT — PAIN DESCRIPTION - DIRECTION: RADIATING_TOWARDS: DOWN LEFT LEG

## 2021-02-04 ASSESSMENT — PAIN DESCRIPTION - LOCATION: LOCATION: HIP

## 2021-02-04 ASSESSMENT — PAIN DESCRIPTION - DESCRIPTORS: DESCRIPTORS: SHARP

## 2021-02-05 ASSESSMENT — ENCOUNTER SYMPTOMS
BACK PAIN: 1
ABDOMINAL PAIN: 0

## 2021-02-05 NOTE — ED TRIAGE NOTES
Pt states she started having left hip pain earlier this morning, took tylenol and went to work. Pt works standing on her feet all day and pain has increased. Pt did take ibuprofen around 1545.

## 2021-02-05 NOTE — ED PROVIDER NOTES
[facility]        Pt Name: Phill Boucher  MRN: [id number]  [place] 1990  Date of evaluation: 2021  Provider: Radha Gan MD  PCP: ACE Schaeffer - CNP  ED Attending: No att. providers found    [address] [place] [address]       Chief Complaint   Patient presents with    Hip Pain     left       HISTORY OF PRESENT ILLNESS   (Location/Symptom, Timing/Onset, Context/Setting, Quality, Duration, Modifying Factors, Severity)  Note limiting factors. Phill Boucher is a 26 y.o. female who started having moderate sharp aching pain to the left low back, radiating to the buttock and down to the popliteal fossa this morning. Movement, especially lumbar flexion, makes the pain worse. Certain positions make the pain better. Denies bowel/bladder retention/incontinence, IVDU, saddle anesthesia. No numbness, tingling, burning. Patient states that this feels similar to sciatica she had when pregnant, except worse. History is obtained from the patient. REVIEW OF SYSTEMS    (2-9 systems for level 4, 10 or more for level 5)     Review of Systems   Constitutional: Negative for chills and fever. Gastrointestinal: Negative for abdominal pain. Genitourinary: Negative for decreased urine volume. Musculoskeletal: Positive for back pain. Negative for neck pain and neck stiffness. Neurological: Negative for weakness and numbness. Positives and Pertinent negatives as per HPI. Except as noted above in the ROS, all other systems were reviewed and negative.        PAST MEDICAL HISTORY     Past Medical History:   Diagnosis Date    Abnormal Pap smear of cervix     normal since    Anemia     after previous     Chlamydia     Remote    Gestational diabetes     MRSA (methicillin resistant staph aureus) culture positive 10/20/2017    C section  - abdomen    Wrist fracture age 8    right wrist; fell off [employer] SURGICAL HISTORY     Past Surgical History:   Procedure Laterality Date     SECTION, LOW TRANSVERSE  2006, 2008, , ,     hx 5 C/S    CHOLECYSTECTOMY  2009    TUBAL LIGATION      WISDOM TOOTH EXTRACTION           [address]       Discharge Medication List as of 2021 11:17 PM            ALLERGIES     Toradol [ketorolac tromethamine], Tramadol, and Vicodin [hydrocodone-acetaminophen]    FAMILYHISTORY       Family History   Problem Relation Age of Onset    Asthma Mother     Miscarriages / [place] Mother           SOCIAL HISTORY       Social History     Socioeconomic History    Marital status: Single     Spouse name: None    Number of children: 5    Years of education: None    Highest education level: None   Occupational History    None   Social Needs    Financial resource strain: None    Food insecurity     Worry: None     Inability: None    Transportation needs     Medical: None     Non-medical: None   Tobacco Use    Smoking status: Never Smoker    Smokeless tobacco: Never Used   Substance and Sexual Activity    Alcohol use: No    Drug use: No    Sexual activity: Yes     Partners: Male   Lifestyle    Physical activity     Days per week: None     Minutes per session: None    Stress: None   Relationships    Social connections     Talks on phone: None     Gets together: None     Attends Mormonism service: None     Active member of club or organization: None     Attends meetings of clubs or organizations: None     Relationship status: None    Intimate partner violence     Fear of current or ex partner: None     Emotionally abused: None     Physically abused: None     Forced sexual activity: None   Other Topics Concern    None   Social History Narrative    None       SCREENINGS             PHYSICAL EXAM    (up to 7 for level 4, 8 or more for level 5)     ED Triage Vitals [21 2253]   BP Temp Temp Source Pulse Resp SpO2 Height Weight 121/77 98.3 °F (36.8 °C) Oral 87 14 100 % 4' 11\" (1.499 m) 190 lb (86.2 kg)       Physical Exam  Constitutional:       General: She is not in acute distress. Appearance: Normal appearance. She is overweight. She is not ill-appearing or toxic-appearing. HENT:      Head: Normocephalic and atraumatic. Cardiovascular:      Rate and Rhythm: Normal rate and regular rhythm. Heart sounds: No murmur. Pulmonary:      Effort: Pulmonary effort is normal. No respiratory distress. Breath sounds: Normal breath sounds. Abdominal:      General: There is no distension. Tenderness: There is no abdominal tenderness. There is no guarding or rebound. Musculoskeletal:      Right hip: Normal.      Left hip: Normal.      Thoracic back: Normal.      Lumbar back: She exhibits decreased range of motion, tenderness, bony tenderness and pain. She exhibits no swelling, no edema, no deformity, no laceration and no spasm. Back:    Neurological:      General: No focal deficit present. Mental Status: She is alert and oriented to person, place, and time. GCS: GCS eye subscore is 4. GCS verbal subscore is 5. GCS motor subscore is 6. Sensory: Sensation is intact. Motor: Motor function is intact. Deep Tendon Reflexes:      Reflex Scores:       Patellar reflexes are 2+ on the right side and 2+ on the left side. Achilles reflexes are 2+ on the right side and 2+ on the left side. Comments: Normal sensation in the L4-S1 dermatomes. Normal strength testing bilaterally with: knee flexion, knee extension, greater toe extension strength, foot dorsi/plantar flexion. Psychiatric:         Behavior: Behavior is cooperative. DIAGNOSTIC RESULTS   LABS:    No results found for this visit on 02/04/21. All other labs were within normal range ornot returned as of this dictation. EKG: All EKG's are interpreted by the Emergency Department Physician who either signs or Co-signs this chart in the absence of a cardiologist.  Please see their note for interpretation of EKG. RADIOLOGY:   Non-plain film images such as CT, Ultrasound and MRI are read by the radiologist.  Plain radiographic images are visualized and preliminarily interpreted by the ED Provider with the belowfindings:    Interpretation per the Radiologist below, if available at the time of this note:    No orders to display         PROCEDURES   Unless otherwise noted below, none     Procedures    CRITICAL CARE TIME   N/A    CONSULTS:  None      EMERGENCY DEPARTMENT COURSE and DIFFERENTIAL DIAGNOSIS/MDM:   Vitals:    Vitals:    02/04/21 2253   BP: 121/77   Pulse: 87   Resp: 14   Temp: 98.3 °F (36.8 °C)   TempSrc: Oral   SpO2: 100%   Weight: 190 lb (86.2 kg)   Height: 4' 11\" (1.499 m)       Patient was given the following medications:  Medications   predniSONE (DELTASONE) tablet 20 mg (20 mg Oral Given 2/4/21 6382)     Patient's history and exam is consistent with sciatica. I am putting her on a burst of steroids. A prescription for pain medication also provided. I want the patient to follow up closely with PCP. She may benefit from PT referral.  Patient is agreeable with this plan. Differential diagnosis included but was not limited to: abdominal aortic aneurysm, cauda equina syndrome, epidural mass lesion, spinal stenosis, herniated disk causing severe stenosis, sprain/strain, fracture, contusion, sciatica, UTI, pyelonephritis, kidney stone. The patient understands the importance of follow up and reasons to return. FINAL IMPRESSION      1. Left hip pain    2. Sciatica of left side    3.  Acute left-sided low back pain with left-sided sciatica          DISPOSITION/PLAN   DISPOSITION Decision To Discharge 02/04/2021 11:11:01 PM      PATIENT REFERRED TO:  ACE Collado - JEAN-PIERRE Leonard [address] [facility] [place] [address]  [phone]    Schedule an appointment as soon as possible for a visit in 3 days      [facility] Emergency Department  [address] [place] [address]  [phone]  Go to   If symptoms worsen      DISCHARGE MEDICATIONS:  Discharge Medication List as of 2/4/2021 11:17 PM      START taking these medications    Details   oxyCODONE-acetaminophen (PERCOCET) 5-325 MG per tablet Take 1 tablet by mouth every 6 hours as needed for Pain for up to 5 days. LAMAR: [id number], Disp-20 tablet, R-0Normal      methylPREDNISolone (MEDROL, MICH,) 4 MG tablet Take by mouth., Disp-1 kit, R-0Normal             DISCONTINUED MEDICATIONS:  Discharge Medication List as of 2/4/2021 11:17 PM      STOP taking these medications       famotidine (PEPCID) 20 MG tablet Comments:   Reason for Stopping:         sucralfate (CARAFATE) 1 GM tablet Comments:   Reason for Stopping:                 Periodic Controlled Substance Monitoring: No signs of potential drug abuse or diversion identified. , Possible medication side effects, risk of tolerance/dependence & alternative treatments discussed.  Chuy Gill MD)    (Please note that portions of this note were completed with a voice recognition program.  Efforts were made to edit the dictations but occasionally words are mis-transcribed.)    Chuy Gill MD(electronically signed)              Chuy Gill MD  02/05/21 8747

## 2021-02-10 ENCOUNTER — HOSPITAL ENCOUNTER (OUTPATIENT)
Dept: GENERAL RADIOLOGY | Age: 31
Discharge: HOME | End: 2021-02-10
Payer: COMMERCIAL

## 2021-02-10 ENCOUNTER — HOSPITAL ENCOUNTER (OUTPATIENT)
Age: 31
Discharge: HOME | End: 2021-02-10
Payer: COMMERCIAL

## 2021-02-10 DIAGNOSIS — R52 PAIN: ICD-10-CM

## 2021-02-10 PROCEDURE — 72070 X-RAY EXAM THORAC SPINE 2VWS: CPT

## 2021-02-10 PROCEDURE — 73502 X-RAY EXAM HIP UNI 2-3 VIEWS: CPT

## 2021-02-10 PROCEDURE — 72100 X-RAY EXAM L-S SPINE 2/3 VWS: CPT

## 2021-09-21 ENCOUNTER — HOSPITAL ENCOUNTER (EMERGENCY)
Age: 31
Discharge: HOME | End: 2021-09-21
Attending: EMERGENCY MEDICINE
Payer: COMMERCIAL

## 2021-09-21 ENCOUNTER — APPOINTMENT (OUTPATIENT)
Dept: GENERAL RADIOLOGY | Age: 31
End: 2021-09-21
Payer: COMMERCIAL

## 2021-09-21 VITALS
OXYGEN SATURATION: 99 % | TEMPERATURE: 98.6 F | DIASTOLIC BLOOD PRESSURE: 88 MMHG | BODY MASS INDEX: 38.3 KG/M2 | WEIGHT: 190 LBS | HEIGHT: 59 IN | SYSTOLIC BLOOD PRESSURE: 116 MMHG | RESPIRATION RATE: 18 BRPM | HEART RATE: 106 BPM

## 2021-09-21 DIAGNOSIS — J45.21 MILD INTERMITTENT REACTIVE AIRWAY DISEASE WITH ACUTE EXACERBATION: Primary | ICD-10-CM

## 2021-09-21 PROCEDURE — 99285 EMERGENCY DEPT VISIT HI MDM: CPT

## 2021-09-21 PROCEDURE — 6370000000 HC RX 637 (ALT 250 FOR IP): Performed by: EMERGENCY MEDICINE

## 2021-09-21 PROCEDURE — 71045 X-RAY EXAM CHEST 1 VIEW: CPT

## 2021-09-21 RX ORDER — PREDNISONE 50 MG/1
50 TABLET ORAL DAILY
Qty: 5 TABLET | Refills: 0 | Status: SHIPPED | OUTPATIENT
Start: 2021-09-21 | End: 2021-09-26

## 2021-09-21 RX ORDER — IPRATROPIUM BROMIDE AND ALBUTEROL SULFATE 2.5; .5 MG/3ML; MG/3ML
1 SOLUTION RESPIRATORY (INHALATION) ONCE
Status: COMPLETED | OUTPATIENT
Start: 2021-09-21 | End: 2021-09-21

## 2021-09-21 RX ORDER — ALBUTEROL SULFATE 90 UG/1
2 AEROSOL, METERED RESPIRATORY (INHALATION) ONCE
Status: COMPLETED | OUTPATIENT
Start: 2021-09-21 | End: 2021-09-21

## 2021-09-21 RX ORDER — BENZONATATE 100 MG/1
100 CAPSULE ORAL ONCE
Status: COMPLETED | OUTPATIENT
Start: 2021-09-21 | End: 2021-09-21

## 2021-09-21 RX ADMIN — ALBUTEROL SULFATE 2 PUFF: 90 AEROSOL, METERED RESPIRATORY (INHALATION) at 22:05

## 2021-09-21 RX ADMIN — PREDNISONE 50 MG: 20 TABLET ORAL at 21:35

## 2021-09-21 RX ADMIN — IPRATROPIUM BROMIDE AND ALBUTEROL SULFATE 1 AMPULE: .5; 3 SOLUTION RESPIRATORY (INHALATION) at 21:35

## 2021-09-21 RX ADMIN — BENZONATATE 100 MG: 100 CAPSULE ORAL at 21:38

## 2021-09-21 ASSESSMENT — PAIN DESCRIPTION - DESCRIPTORS: DESCRIPTORS: ACHING

## 2021-09-21 ASSESSMENT — PAIN SCALES - GENERAL: PAINLEVEL_OUTOF10: 7

## 2021-09-22 NOTE — ED PROVIDER NOTES
Emergency Department Attending Note    Emmy Holguin MD    Date of ED VIsit: 2021    CHIEF COMPLAINT  Shortness of Breath (cough, body aches, hurts to breathe. Started a few hours ago)      HISTORY OF PRESENT ILLNESS  Gualberto Stuart is a 26 y.o. female  With Vital signs of /88   Pulse 106   Temp 98.6 °F (37 °C) (Oral)   Resp 18   Ht 4' 11\" (1.499 m)   Wt 190 lb (86.2 kg)   SpO2 99%   BMI 38.38 kg/m²  who presents to the ED with a complaint of shortness of breath and cough. Patient seen and evaluated in room 1. Patient states she woke up this morning with mild sensation of shortness of breath that further developed into a cough that is further given her headache and a sore throat. She has no history of asthma. She is not a smoker. No long trips no leg swelling. No chest pain. On forced expiration she has coughing fit and she is wheezing. .  No other complaints, modifying factors or associated symptoms. Patients Past medical history reviewed and listed below  Past Medical History:   Diagnosis Date    Abnormal Pap smear of cervix     normal since    Anemia     after previous     Chlamydia     Remote    Gestational diabetes     MRSA (methicillin resistant staph aureus) culture positive 10/20/2017    C section  - abdomen    Wrist fracture age 8    right wrist; fell off [employer] gym     Past Surgical History:   Procedure Laterality Date     SECTION, LOW TRANSVERSE  2006, 2008, , ,     hx 5 C/S    CHOLECYSTECTOMY      TUBAL LIGATION      WISDOM TOOTH EXTRACTION         I have reviewed the following from the nursing documentation.     Family History   Problem Relation Age of Onset    Asthma Mother     Miscarriages / [place] Mother      Social History     Socioeconomic History    Marital status: Single     Spouse name: Not on file    Number of children: 11    Years of education: Not on file    Highest education level: Not on file Occupational History    Not on file   Tobacco Use    Smoking status: Never Smoker    Smokeless tobacco: Never Used   Vaping Use    Vaping Use: Never used   Substance and Sexual Activity    Alcohol use: No    Drug use: No    Sexual activity: Yes     Partners: Male   Other Topics Concern    Not on file   Social History Narrative    Not on file     Social Determinants of Health     Financial Resource Strain:     Difficulty of Paying Living Expenses:    Food Insecurity:     Worried About Running Out of Food in the Last Year:     [address] in the Last Year:    Transportation Needs:     Lack of Transportation (Medical):  Lack of Transportation (Non-Medical):    Physical Activity:     Days of Exercise per Week:     Minutes of Exercise per Session:    Stress:     Feeling of Stress :    Social Connections:     Frequency of Communication with Friends and Family:     Frequency of Social Gatherings with Friends and Family:     Attends [employer] Services:     Active Member of Clubs or Organizations:     Attends Club or Organization Meetings:     Marital Status:    Intimate Partner Violence:     Fear of Current or Ex-Partner:     Emotionally Abused:     Physically Abused:     Sexually Abused:      No current facility-administered medications for this encounter. No current outpatient medications on file. Allergies   Allergen Reactions    Toradol [Ketorolac Tromethamine]     Tramadol Itching and Nausea And Vomiting    Vicodin [Hydrocodone-Acetaminophen] Itching and Nausea Only       REVIEW OF SYSTEMS  10 systems reviewed, pertinent positives per HPI otherwise noted to be negative     PHYSICAL EXAM  /88   Pulse 106   Temp 98.6 °F (37 °C) (Oral)   Resp 18   Ht 4' 11\" (1.499 m)   Wt 190 lb (86.2 kg)   SpO2 99%   BMI 38.38 kg/m²   GENERAL APPEARANCE: Awake and alert. Cooperative. In no obvious distress until she starts forced expiration. HEAD: Normocephalic. Atraumatic. EYES: PERRL. EOM's grossly intact. ENT: Mucous membranes are pink and moist.   NECK: Supple. HEART: RRR. No murmurs. LUNGS: Respirations unlabored. Bilateral wheezing on forced expiration. Good air exchange. ABDOMEN: Soft. Non-distended. Non-tender. No masses. No organomegaly. No guarding or rebound. EXTREMITIES: No peripheral edema. Moves all extremities equally. All extremities neurovascularly intact. SKIN: Warm and dry. No acute rashes. NEUROLOGICAL: Alert and oriented. Strength 5/5, sensation intact. Gait normal.   PSYCHIATRIC: Normal mood and affect. No HI or SI expressed to me. RADIOLOGY    If acquired see below     EKG:     If acquired see below       ED COURSE/MDM    Patient states that she feels better after the albuterol treatment so she will be given another double hit of a Proventil inhaler and give her the remainder. She was given steroids which will take a little while to take effect which that should help her as well. She should follow-up with her primary care physician for further follow-up. ED Course as of Sep 21 2151   Tue Sep 21, 2021   2145    IMPRESSION:  Mild cardiomegaly with no acute pulmonary abnormality.      XR CHEST PORTABLE [DL]      ED Course User Index  [DL] Jaron Cardenas MD       The ED course and plan were reviewed and results discussed with the patient    The patient understood and agreed with the Discharge/transfer planning.     CLINICAL IMPRESSION and DISPOSITION    Maryann Banks was stable and diagnosed with reactive airway disease    Patient was treated with DuoNeb and prednisone       Jaron Cardenas MD  09/21/21 2152

## 2021-09-22 NOTE — ED NOTES
Discharge instructions and medications reviewed with patient. Patient verbalized understanding of medications and follow up. All questions answered at this time. Skin warm, pink, and dry. Patient alert and oriented x4. Pt ambulated to lobby with a stable gait. Patient discharged home with 1 prescriptions.       Hannah Wilson RN  09/21/21 0115

## 2021-12-04 ENCOUNTER — HOSPITAL ENCOUNTER (EMERGENCY)
Age: 31
Discharge: HOME | End: 2021-12-04
Attending: EMERGENCY MEDICINE
Payer: COMMERCIAL

## 2021-12-04 ENCOUNTER — APPOINTMENT (OUTPATIENT)
Dept: CT IMAGING | Age: 31
End: 2021-12-04
Payer: COMMERCIAL

## 2021-12-04 VITALS
RESPIRATION RATE: 18 BRPM | DIASTOLIC BLOOD PRESSURE: 81 MMHG | HEART RATE: 92 BPM | SYSTOLIC BLOOD PRESSURE: 128 MMHG | BODY MASS INDEX: 38.3 KG/M2 | TEMPERATURE: 98.6 F | OXYGEN SATURATION: 99 % | WEIGHT: 190 LBS | HEIGHT: 59 IN

## 2021-12-04 DIAGNOSIS — S00.83XA CONTUSION OF MANDIBULAR JOINT AREA, INITIAL ENCOUNTER: Primary | ICD-10-CM

## 2021-12-04 PROCEDURE — 6370000000 HC RX 637 (ALT 250 FOR IP): Performed by: EMERGENCY MEDICINE

## 2021-12-04 PROCEDURE — 70486 CT MAXILLOFACIAL W/O DYE: CPT

## 2021-12-04 PROCEDURE — 99284 EMERGENCY DEPT VISIT MOD MDM: CPT

## 2021-12-04 RX ORDER — ACETAMINOPHEN 325 MG/1
650 TABLET ORAL ONCE
Status: COMPLETED | OUTPATIENT
Start: 2021-12-04 | End: 2021-12-04

## 2021-12-04 RX ORDER — IBUPROFEN 600 MG/1
600 TABLET ORAL ONCE
Status: COMPLETED | OUTPATIENT
Start: 2021-12-04 | End: 2021-12-04

## 2021-12-04 RX ADMIN — IBUPROFEN 600 MG: 600 TABLET, FILM COATED ORAL at 12:02

## 2021-12-04 RX ADMIN — ACETAMINOPHEN 650 MG: 325 TABLET ORAL at 12:02

## 2021-12-04 ASSESSMENT — PAIN SCALES - GENERAL
PAINLEVEL_OUTOF10: 8
PAINLEVEL_OUTOF10: 8

## 2021-12-04 ASSESSMENT — PAIN DESCRIPTION - PAIN TYPE: TYPE: ACUTE PAIN

## 2021-12-04 ASSESSMENT — PAIN DESCRIPTION - FREQUENCY: FREQUENCY: CONTINUOUS

## 2021-12-04 ASSESSMENT — PAIN DESCRIPTION - ONSET: ONSET: SUDDEN

## 2021-12-04 ASSESSMENT — PAIN DESCRIPTION - PROGRESSION: CLINICAL_PROGRESSION: NOT CHANGED

## 2021-12-04 ASSESSMENT — ENCOUNTER SYMPTOMS
BACK PAIN: 0
SHORTNESS OF BREATH: 0
ABDOMINAL PAIN: 0

## 2021-12-04 ASSESSMENT — PAIN DESCRIPTION - DESCRIPTORS: DESCRIPTORS: PRESSURE

## 2021-12-04 ASSESSMENT — PAIN DESCRIPTION - LOCATION: LOCATION: JAW

## 2021-12-04 NOTE — ED PROVIDER NOTES
normal since    Anemia     after previous     Chlamydia     Remote    Gestational diabetes     MRSA (methicillin resistant staph aureus) culture positive 10/20/2017    C section  - abdomen    Wrist fracture age 8    right wrist; fell off jungle gym         SURGICAL HISTORY       Past Surgical History:   Procedure Laterality Date     SECTION, LOW TRANSVERSE  2006, , , ,     hx 5 C/S    CHOLECYSTECTOMY      TUBAL LIGATION      WISDOM TOOTH EXTRACTION           CURRENT MEDICATIONS       Previous Medications    No medications on file       ALLERGIES     Toradol [ketorolac tromethamine], Tramadol, and Vicodin [hydrocodone-acetaminophen]    FAMILY HISTORY       Family History   Problem Relation Age of Onset    Asthma Mother     Miscarriages / [place] Mother           SOCIAL HISTORY       Social History     Socioeconomic History    Marital status: Single     Spouse name: None    Number of children: 5    Years of education: None    Highest education level: None   Occupational History    None   Tobacco Use    Smoking status: Never Smoker    Smokeless tobacco: Never Used   Vaping Use    Vaping Use: Never used   Substance and Sexual Activity    Alcohol use: No    Drug use: No    Sexual activity: Yes     Partners: Male   Other Topics Concern    None   Social History Narrative    None     Social Determinants of Health     Financial Resource Strain:     Difficulty of Paying Living Expenses: Not on file   Food Insecurity:     Worried About Running Out of Food in the Last Year: Not on file    [place] of Food in the Last Year: Not on file   Transportation Needs:     Lack of Transportation (Medical): Not on file    Lack of Transportation (Non-Medical):  Not on file   Physical Activity:     Days of Exercise per Week: Not on file    Minutes of Exercise per Session: Not on file   Stress:     Feeling of Stress : Not on file   Social Connections:     Frequency of Communication with Friends and Family: Not on file    Frequency of Social Gatherings with Friends and Family: Not on file    Attends Quaker Services: Not on file    Active Member of Clubs or Organizations: Not on file    Attends Club or Organization Meetings: Not on file    Marital Status: Not on file   Intimate Partner Violence:     Fear of Current or Ex-Partner: Not on file    Emotionally Abused: Not on file    Physically Abused: Not on file    Sexually Abused: Not on file   Housing Stability:     Unable to Pay for Housing in the Last Year: Not on file    Number of [place] in the Last Year: Not on file    Unstable Housing in the Last Year: Not on file       SCREENINGS               PHYSICAL EXAM    (up to 7 for level 4, 8 or more for level 5)     ED Triage Vitals [12/04/21 1051]   BP Temp Temp Source Pulse Resp SpO2 Height Weight   128/81 98.6 °F (37 °C) Oral 92 18 99 % 4' 11\" (1.499 m) 190 lb (86.2 kg)       Physical Exam  Vitals and nursing note reviewed. Constitutional:       General: She is not in acute distress. Appearance: Normal appearance. She is well-developed. She is not diaphoretic. HENT:      Head: Normocephalic. Right Ear: Tympanic membrane, ear canal and external ear normal.      Left Ear: Tympanic membrane, ear canal and external ear normal.      Nose: Nose normal.      Mouth/Throat:      Mouth: Injury present. No lacerations or angioedema. Dentition: Normal dentition. Palate: No mass and lesions. Pharynx: Oropharynx is clear. Eyes:      Conjunctiva/sclera: Conjunctivae normal.      Pupils: Pupils are equal, round, and reactive to light. Neck:      Thyroid: No thyromegaly. Cardiovascular:      Rate and Rhythm: Normal rate and regular rhythm. Heart sounds: Normal heart sounds. No murmur heard. No friction rub. No gallop. Pulmonary:      Effort: Pulmonary effort is normal. No respiratory distress.       Breath sounds: Normal breath sounds. Abdominal:      General: Bowel sounds are normal. There is no distension. Palpations: Abdomen is soft. Tenderness: There is no abdominal tenderness. Musculoskeletal:      Cervical back: Normal range of motion and neck supple. Neurological:      Mental Status: She is alert and oriented to person, place, and time. GCS: GCS eye subscore is 4. GCS verbal subscore is 5. GCS motor subscore is 6. Cranial Nerves: No cranial nerve deficit. Sensory: No sensory deficit. Motor: No abnormal muscle tone. Coordination: Coordination normal.      Deep Tendon Reflexes: Reflexes normal.   Psychiatric:         Behavior: Behavior normal.       Exam inside the mouth reveals no separation of teeth no hematoma no other abnormalities noted. She has a GCS of 15 cranial nerves II through XII intact she has no severe malocclusion no severe trismus but she is tender over the right TMJ  DIAGNOSTIC RESULTS     EKG: All EKG's are interpreted by the Emergency Department Physician who either signs or Co-signs this chart in the absence of a cardiologist.        RADIOLOGY:   Non-plain film images such as CT, Ultrasound and MRI are read by the radiologist. Plain radiographic images are visualized and preliminarily interpreted by the emergency physician with the below findings:        Interpretation per the Radiologist below, if available at the time of this note:    CT MAXILLOFACIAL WO CONTRAST   Preliminary Result   1. No radiographic findings to suggest the presence of acute facial bone   fracture. 2. Findings suggestive of the presence of dental caries involving the right   posterior-most maxillary molar as described above. LABS:  No results found for this visit on 12/04/21.          EMERGENCY DEPARTMENT COURSE and DIFFERENTIAL DIAGNOSIS/MDM:     Vitals:    12/04/21 1051   BP: 128/81   Pulse: 92   Resp: 18   Temp: 98.6 °F (37 °C)   TempSrc: Oral   SpO2: 99%   Weight: 190 lb (86.2 kg)   Height: 4' 11\" (1.499 m)           MDM      REASSESSMENT          CRITICAL CARE TIME     CONSULTS:  None      PROCEDURES:     Procedures    MEDICATIONS GIVEN THIS VISIT:  Medications   ibuprofen (ADVIL;MOTRIN) tablet 600 mg (has no administration in time range)   acetaminophen (TYLENOL) tablet 650 mg (has no administration in time range)        FINAL IMPRESSION      1. Contusion of mandibular joint area, initial encounter            DISPOSITION/PLAN   DISPOSITION        PATIENT REFERRED TO:  Susanmelissa Aaron, APRN - CNP  [address] [place] [address]  [phone]          Your dentist            DISCHARGE MEDICATIONS:  New Prescriptions    No medications on file       Controlled Substances Monitoring  RX Monitoring 2/4/2021   Periodic Controlled Substance Monitoring No signs of potential drug abuse or diversion identified. ;Possible medication side effects, risk of tolerance/dependence & alternative treatments discussed. (Please note that portions of this note were completed with a voice recognition program.  Efforts were made to edit the dictations but occasionally words are mis-transcribed.)    Patient was advised to return to the Emergency Department if there was any worsening.     Eddy Thompson MD (electronically signed)  Attending Emergency Physician          Momo Reyes MD  12/04/21 9066

## 2021-12-04 NOTE — ED NOTES
Pt discharge instructions, follow up reviewed with pt. Pt verbalized understanding. No further needs. Pt discharged at this time.         Kelly Howe RN  12/04/21 2955

## 2021-12-04 NOTE — Clinical Note
Belle Dubon was seen and treated in our emergency department on 12/4/2021. She may return to work on 12/05/2021. If you have any questions or concerns, please don't hesitate to call.       Jennifer Allen MD

## 2022-06-06 ENCOUNTER — HOSPITAL ENCOUNTER (EMERGENCY)
Age: 32
Discharge: HOME | End: 2022-06-06
Attending: EMERGENCY MEDICINE
Payer: COMMERCIAL

## 2022-06-06 ENCOUNTER — APPOINTMENT (OUTPATIENT)
Dept: GENERAL RADIOLOGY | Age: 32
End: 2022-06-06
Payer: COMMERCIAL

## 2022-06-06 VITALS
OXYGEN SATURATION: 99 % | WEIGHT: 180 LBS | BODY MASS INDEX: 36.29 KG/M2 | SYSTOLIC BLOOD PRESSURE: 123 MMHG | RESPIRATION RATE: 16 BRPM | HEIGHT: 59 IN | HEART RATE: 80 BPM | DIASTOLIC BLOOD PRESSURE: 74 MMHG | TEMPERATURE: 97.4 F

## 2022-06-06 DIAGNOSIS — G56.31: ICD-10-CM

## 2022-06-06 DIAGNOSIS — M77.8 ELBOW TENDONITIS: Primary | ICD-10-CM

## 2022-06-06 PROCEDURE — 6370000000 HC RX 637 (ALT 250 FOR IP): Performed by: EMERGENCY MEDICINE

## 2022-06-06 PROCEDURE — 99283 EMERGENCY DEPT VISIT LOW MDM: CPT

## 2022-06-06 PROCEDURE — 73070 X-RAY EXAM OF ELBOW: CPT

## 2022-06-06 RX ORDER — PREDNISONE 20 MG/1
40 TABLET ORAL ONCE
Status: COMPLETED | OUTPATIENT
Start: 2022-06-06 | End: 2022-06-06

## 2022-06-06 RX ORDER — PREDNISONE 20 MG/1
40 TABLET ORAL 2 TIMES DAILY
Qty: 16 TABLET | Refills: 0 | Status: SHIPPED | OUTPATIENT
Start: 2022-06-06 | End: 2022-06-10

## 2022-06-06 RX ADMIN — PREDNISONE 40 MG: 20 TABLET ORAL at 23:05

## 2022-06-06 ASSESSMENT — PAIN DESCRIPTION - ORIENTATION: ORIENTATION: RIGHT

## 2022-06-06 ASSESSMENT — PAIN DESCRIPTION - PAIN TYPE: TYPE: CHRONIC PAIN

## 2022-06-06 ASSESSMENT — PAIN SCALES - GENERAL: PAINLEVEL_OUTOF10: 6

## 2022-06-06 ASSESSMENT — PAIN DESCRIPTION - LOCATION: LOCATION: ELBOW

## 2022-06-06 ASSESSMENT — PAIN - FUNCTIONAL ASSESSMENT: PAIN_FUNCTIONAL_ASSESSMENT: 0-10

## 2022-06-07 NOTE — ED PROVIDER NOTES
[facility]      CHIEF COMPLAINT  Arm Pain (C/O right elbow pain \"been so long, can't remember what I did to it\")       HISTORY OF PRESENT ILLNESS  Norberto Moreira is a 33 y.o. female  who presents to the ED complaining of right elbow pain. The patient states that she has had this pain for quite some time but it is worsened recently. She denies any recent injury. She states she is a , when lifting plates off of the table or extending her elbow she feels a shooting pain down the back of her forearm and then to her thumb and second digit. She denies any numbness or weakness of her arm. She denies any other symptoms such as chest pain or shortness of breath. No other complaints, modifying factors or associated symptoms. I have reviewed the following from the nursing documentation.     Past Medical History:   Diagnosis Date    Abnormal Pap smear of cervix     normal since    Anemia     after previous     Chlamydia     Remote    Gestational diabetes     MRSA (methicillin resistant staph aureus) culture positive 10/20/2017    C section  - abdomen    Wrist fracture age 8    right wrist; fell off jungle gym     Past Surgical History:   Procedure Laterality Date     SECTION, LOW TRANSVERSE  2006, , , ,     hx 5 C/S    CHOLECYSTECTOMY  2009    TUBAL LIGATION      WISDOM TOOTH EXTRACTION  2010     Family History   Problem Relation Age of Onset    Asthma Mother     Miscarriages / [place] Mother      Social History     Socioeconomic History    Marital status: Single     Spouse name: Not on file    Number of children: 11    Years of education: Not on file    Highest education level: Not on file   Occupational History    Not on file   Tobacco Use    Smoking status: Never Smoker    Smokeless tobacco: Never Used   Vaping Use    Vaping Use: Never used   Substance and Sexual Activity    Alcohol use: No    Drug use: No    Sexual activity: Yes     Partners: Male   Other Topics Concern    Not on file   Social History Narrative    Not on file     Social Determinants of Health     Financial Resource Strain:     Difficulty of Paying Living Expenses: Not on file   Food Insecurity:     Worried About Running Out of Food in the Last Year: Not on file    [place] of Food in the Last Year: Not on file   Transportation Needs:     Lack of Transportation (Medical): Not on file    Lack of Transportation (Non-Medical): Not on file   Physical Activity:     Days of Exercise per Week: Not on file    Minutes of Exercise per Session: Not on file   Stress:     Feeling of Stress : Not on file   Social Connections:     Frequency of Communication with Friends and Family: Not on file    Frequency of Social Gatherings with Friends and Family: Not on file    Attends Spiritism Services: Not on file    Active Member of [address] [place] [address] [email or web address] or Organizations: Not on file    Attends Club or Organization Meetings: Not on file    Marital Status: Not on file   Intimate Partner Violence:     Fear of Current or Ex-Partner: Not on file    Emotionally Abused: Not on file    Physically Abused: Not on file    Sexually Abused: Not on file   Housing Stability:     Unable to Pay for Housing in the Last Year: Not on file    Number of [place] in the Last Year: Not on file    Unstable Housing in the Last Year: Not on file     No current facility-administered medications for this encounter. Current Outpatient Medications   Medication Sig Dispense Refill    predniSONE (DELTASONE) 20 MG tablet Take 2 tablets by mouth 2 times daily for 4 days 16 tablet 0     Allergies   Allergen Reactions    Toradol [Ketorolac Tromethamine]     Tramadol Itching and Nausea And Vomiting    Vicodin [Hydrocodone-Acetaminophen] Itching and Nausea Only       REVIEW OF SYSTEMS  10 systems reviewed, pertinent positives per HPI otherwise noted to be negative.     PHYSICAL EXAM  /74   Pulse 80   Temp 97.4 °F (36.3 °C) (Oral)   Resp 16   Ht 4' 11\" (1.499 m)   Wt 180 lb (81.6 kg)   LMP 05/17/2022 (Exact Date)   SpO2 99%   Breastfeeding No   BMI 36.36 kg/m²    Physical exam:  General appearance: awake and cooperative. no distress. Non toxic appearing. Skin: Warm and dry. No rashes or lesions. HENT: Normocephalic. Atraumatic. Neck: supple  Eyes: MARKO. EOM intact. Heart: RRR. Lungs: Respirations unlabored. CTAB. Abdomen: Non distended. No peritoneal signs. Musculoskeletal: right elbow: there is tenderness to palpation on medial and posterior aspect; no soft tissue swelling; bicep and tricep tendon intact there is reproduction of symptoms with extension of elbow and wrist however wrist flexion/extension is intact; sensation intact all digits; median, radial, and ulnar nerves intact motor and sensory. cardinal motions of hand intact. MCP, PIP, and DIP intact with flexion and extension 5/5 strength. Cap refill <2 seconds. Radial and ulnar pulses +2/4 bilaterally. No deformity. No tenderness in the extremities. All extremities neurovascularly intact. Neurological: Alert and oriented. No focal deficits. No aphasia or dysarthria. No gait ataxia. Psychiatric: Normal mood and affect. LABS  I have reviewed all labs for this visit. No results found for this visit on 06/06/22. RADIOLOGY  XR ELBOW RIGHT (2 VIEWS)    Result Date: 6/6/2022  EXAMINATION: 2 XRAY VIEWS OF THE RIGHT ELBOW 6/6/2022 10:53 pm COMPARISON: None. HISTORY: ORDERING SYSTEM PROVIDED HISTORY: right elbow pain TECHNOLOGIST PROVIDED HISTORY: Reason for exam:->right elbow pain Reason for Exam: right elbow pain FINDINGS: There is no elbow effusion. There is no acute fracture or dislocation. Alignment is normal.     No acute abnormality. No results found. ED COURSE/MDM  Patient seen and evaluated. Old records reviewed. Labs and imaging reviewed and results discussed with patient.       The patient is a 26-year-old female presenting for evaluation of right elbow pain. Vital signs are within normal limits. She is nontoxic-appearing. Her right upper extremity is neurovascularly intact. X-ray is unremarkable. Based on exam and history this appears to be a tendinitis, with irritation of the radial nerve. The patient is given a short course of prednisone first dose given here to reduce inflammation. She states she is allergic to multiple medications, I discussed that she needs to use RICE therapy at home. She is also to follow-up with orthopedics and is given a referral, as bracing can help as well as potential steroid injections. The patient is understanding of the plan of care for home. She is given strict return precautions back to the ED and voices understanding. During the patient's ED course, the patient was given:  Medications   predniSONE (DELTASONE) tablet 40 mg (40 mg Oral Given 6/6/22 2305)        CLINICAL IMPRESSION  1. Elbow tendonitis    2. Irritation of right radial nerve        Blood pressure 123/74, pulse 80, temperature 97.4 °F (36.3 °C), temperature source Oral, resp. rate 16, height 4' 11\" (1.499 m), weight 180 lb (81.6 kg), last menstrual period 05/17/2022, SpO2 99 %, not currently breastfeeding. Patient was given scripts for the following medications. I counseled patient how to take these medications. Discharge Medication List as of 6/6/2022 11:37 PM      START taking these medications    Details   predniSONE (DELTASONE) 20 MG tablet Take 2 tablets by mouth 2 times daily for 4 days, Disp-16 tablet, R-0Normal             Follow-up with:  Jade Santoro MD  [address] [place] [address]  [phone]    Call in 1 day        DISCLAIMER: This chart was created using Dragon dictation software.   Efforts were made by me to ensure accuracy, however some errors may be present due to limitations of this technology and occasionally words are not transcribed correctly.        [place], [address] [place] [address]  06/07/22 2994

## 2022-12-15 ENCOUNTER — HOSPITAL ENCOUNTER (EMERGENCY)
Age: 32
Discharge: HOME | End: 2022-12-15
Payer: COMMERCIAL

## 2022-12-15 VITALS
DIASTOLIC BLOOD PRESSURE: 71 MMHG | BODY MASS INDEX: 38.3 KG/M2 | SYSTOLIC BLOOD PRESSURE: 107 MMHG | HEART RATE: 75 BPM | HEIGHT: 59 IN | RESPIRATION RATE: 18 BRPM | OXYGEN SATURATION: 98 % | WEIGHT: 190 LBS | TEMPERATURE: 97.4 F

## 2022-12-15 DIAGNOSIS — K08.89 ODONTALGIA: Primary | ICD-10-CM

## 2022-12-15 PROCEDURE — 6370000000 HC RX 637 (ALT 250 FOR IP): Performed by: PHYSICIAN ASSISTANT

## 2022-12-15 PROCEDURE — 99283 EMERGENCY DEPT VISIT LOW MDM: CPT

## 2022-12-15 RX ORDER — AMOXICILLIN 500 MG/1
500 CAPSULE ORAL 3 TIMES DAILY
Qty: 30 CAPSULE | Refills: 0 | Status: SHIPPED | OUTPATIENT
Start: 2022-12-15 | End: 2022-12-25

## 2022-12-15 RX ORDER — AMOXICILLIN 500 MG/1
500 CAPSULE ORAL ONCE
Status: COMPLETED | OUTPATIENT
Start: 2022-12-15 | End: 2022-12-15

## 2022-12-15 RX ADMIN — AMOXICILLIN 500 MG: 500 CAPSULE ORAL at 20:51

## 2022-12-15 ASSESSMENT — ENCOUNTER SYMPTOMS
TRISMUS: 0
VOMITING: 0
NAUSEA: 1
SHORTNESS OF BREATH: 0

## 2022-12-15 ASSESSMENT — PAIN - FUNCTIONAL ASSESSMENT: PAIN_FUNCTIONAL_ASSESSMENT: 0-10

## 2022-12-15 ASSESSMENT — PAIN DESCRIPTION - LOCATION: LOCATION: TEETH

## 2022-12-15 ASSESSMENT — PAIN SCALES - GENERAL: PAINLEVEL_OUTOF10: 8

## 2022-12-15 ASSESSMENT — PAIN DESCRIPTION - DESCRIPTORS: DESCRIPTORS: ACHING

## 2022-12-15 ASSESSMENT — PAIN DESCRIPTION - ORIENTATION: ORIENTATION: RIGHT

## 2022-12-15 ASSESSMENT — PAIN DESCRIPTION - PAIN TYPE: TYPE: ACUTE PAIN

## 2022-12-16 NOTE — ED TRIAGE NOTES
Chief Complaint   Patient presents with    Dental Pain     Broken molar on right side, c/o pain in area

## 2022-12-16 NOTE — DISCHARGE INSTRUCTIONS
Toothache    Toothaches are generally caused by tooth decay. If you have severe pain or swelling around a tooth, you may have a deep tooth infection. Tooth decay and infections require evaluation and treatment by a dentist or an oral surgeon. The emergency department will provide you with the best possible care available for your dental problem. Unfortunately, there is not a dentist or dental clinic available in the department. Routine dental care, such as fillings, tooth extractions, or cb canals are not available in the emergency department. However, we are avaialbe for emergencies including abscesses, fractures of the jaw and other oral trauma such as a tooth that is knocked out. Any other dental problem is best treated by a dentist.  Please see the list of dental clinics in the area if you do not currently have a dentist.      Treatment That Can Be Provided for Toothache in the Emergency Department    If you have a severe toothache, medication may be prescribed for you until you are able to see a dentist.  If you are given an antibiotic, take it as prescribed and continue to take it until gone. Even if you start to feel better, your toothache will need to be treated by a dentist or an oral surgeon. Pain medication may be prescribed for you. As is the policy of the [facility] Department, the emergency department uses nonsteroidal anti-inflammatory medication (NSAIDs) as the primary medication for pain. These may include ibuprofen (Motrin®) or naproxyn sodium (Naprosyn®). Patients who are unable to take nonsteroidal anti-inflammatory medications will generally be advised to take acetaminophen (Tylenol®) for dental pain. As is the policy of the [address] [place] [address], the hospitals emergency department policy strongly discourages the use of the narcotic medications. (Tylenol #3®, Vicodin®, etc) are restricted by specific, strict guidelines.     If you have any questions concerning these instructions, call the emergency department at [facility] @ [phone]. Consult your care giver regarding these instructions and seek your physicians advice regarding medical care. Dental Referrals  Following is a list of local clinics that treat dental problems. Many also have specific emergency hours. For an appointment or for hours of operation, contact the clinic. [address]  [phone]     The [facility]  [address]    [facility] (referral agency)  [address] [facility], [address] [place] [address],  [facility]  [phone] or [phone]  (Application Process, Must Qualify)     [facility] [address] Yolanda Guerrero Esha Ordonez. [address]  [phone]  ([facility] Medicaid and Insurance with Payment plans for Self-Insured)     Clinics:    [address] [facility]   [address]  Janine Orr, [address]  4[address] [facility]  [address]  [facility] [address] [place], [address] [facility]  6 [facility], 2215 [facility], [address] Street:    [facility] Oliver Mcgraw  Appointment only  [address] [facility], [address] [facility]  Linda-Indonesian speaking  [address]  [address]  [place], [address]  [address] [facility]  81 [facility]  Nini Argentina [facility] 119  [address]  [place]  [facility] 167.   [place], [address]  [address]  [facility]  [address], [place] [address]  Elaina Vallec[address] 61  59[address] [facility], [address] [place]     [facility]   [address]  [place], [address] Alana Child  [phone] ext.  [address]  10 [facility], 1100 [facility]  [phone] or [phone]  (Must qualify)

## 2022-12-16 NOTE — ED PROVIDER NOTES
[address] ED  EMERGENCY DEPARTMENT ENCOUNTER        Pt Name: Umm Garcia  MRN: [id number]  [place] 1990  Date of evaluation: 12/15/2022  Provider: Matty Rios PA-C  PCP: ACE Norris CNP    Shared Visit or Autonomous Visit: MORRIS. I have evaluated this patient. My supervising physician was available for consultation. CHIEF COMPLAINT       Chief Complaint   Patient presents with    Dental Pain     Broken molar on right side, c/o pain in area       HISTORY OF PRESENT ILLNESS   (Location/Symptom, Timing/Onset, Context/Setting, Quality, Duration, Modifying Factors, Severity)  Note limiting factors. Umm Garcia is a 27 y.o. female presenting to the emergency department for evaluation of right upper dental pain states she has a broken tooth that is bothering her and has pain into her right ear. The history is provided by the patient. Dental Pain  Location:  Upper  Upper teeth location: right upper molar. Onset quality:  Gradual  Duration: days. Progression:  Worsening  Chronicity:  New  Associated symptoms: no difficulty swallowing, no fever, no neck swelling and no trismus        Nursing Notes were reviewed    REVIEW OF SYSTEMS    (2-9 systems for level 4, 10 or more for level 5)     Review of Systems   Constitutional:  Negative for fever. HENT:  Positive for dental problem and ear pain. Respiratory:  Negative for shortness of breath. Cardiovascular:  Negative for chest pain. Gastrointestinal:  Positive for nausea. Negative for vomiting. Positives and Pertinent negatives as per HPI.        PAST MEDICAL HISTORY     Past Medical History:   Diagnosis Date    Abnormal Pap smear of cervix     normal since    Anemia     after previous     Chlamydia     Remote    Gestational diabetes     MRSA (methicillin resistant staph aureus) culture positive 10/20/2017    C section  - abdomen    Wrist fracture age 8    right wrist; fell off [employer] SURGICAL HISTORY     Past Surgical History:   Procedure Laterality Date     SECTION, LOW TRANSVERSE  2006, 2008, , , 2015    hx 5 C/S    CHOLECYSTECTOMY  2009    TUBAL LIGATION      WISDOM TOOTH EXTRACTION  2010         CURRENTMEDICATIONS       Previous Medications    No medications on file         ALLERGIES     Toradol [ketorolac tromethamine], Tramadol, and Vicodin [hydrocodone-acetaminophen]    FAMILYHISTORY       Family History   Problem Relation Age of Onset    Asthma Mother     Miscarriages / Adam Orris Mother           SOCIAL HISTORY       Social History     Socioeconomic History    Marital status: Single     Spouse name: None    Number of children: 5    Years of education: None    Highest education level: None   Tobacco Use    Smoking status: Never    Smokeless tobacco: Never   Vaping Use    Vaping Use: Never used   Substance and Sexual Activity    Alcohol use: No    Drug use: No    Sexual activity: Yes     Partners: Male       SCREENINGS             PHYSICAL EXAM    (up to 7 for level 4, 8 or more for level 5)     ED Triage Vitals [12/15/22 2031]   BP Temp Temp Source Heart Rate Resp SpO2 Height Weight   107/71 97.4 °F (36.3 °C) Oral 75 18 98 % 4' 11\" (1.499 m) 190 lb (86.2 kg)       Physical Exam  Vitals and nursing note reviewed. Constitutional:       Appearance: She is well-developed. She is not toxic-appearing. HENT:      Head: Normocephalic and atraumatic. Right Ear: Tympanic membrane, ear canal and external ear normal. No drainage or swelling. Tympanic membrane is not erythematous. Left Ear: Tympanic membrane, ear canal and external ear normal. No drainage or swelling. Tympanic membrane is not erythematous. Mouth/Throat:      Mouth: Mucous membranes are moist.      Dentition: Dental tenderness and dental caries present. Pharynx: Oropharynx is clear. Uvula midline. No pharyngeal swelling, posterior oropharyngeal erythema or uvula swelling.       Comments: Tenderness to palpation right upper rear most molar, chipped tooth, tenderness right upper gums. No obvious swelling. No abscess. Oropharynx is clear. Eyes:      Conjunctiva/sclera: Conjunctivae normal.   Cardiovascular:      Rate and Rhythm: Normal rate and regular rhythm. Heart sounds: Normal heart sounds. Pulmonary:      Effort: Pulmonary effort is normal. No respiratory distress. Breath sounds: Normal breath sounds. No stridor. No wheezing or rales. Musculoskeletal:      Cervical back: Normal range of motion and neck supple. No rigidity. Skin:     General: Skin is warm and dry. Neurological:      Mental Status: She is alert and oriented to person, place, and time. Motor: No abnormal muscle tone. Psychiatric:         Behavior: Behavior normal. Behavior is cooperative. DIAGNOSTIC RESULTS   LABS:    Labs Reviewed - No data to display    No results found for this visit on 12/15/22. All other labs were not returned as of this dictation. EKG: All EKG's are interpreted by the Emergency Department Physician in the absence of a cardiologist.  Please see their note for interpretation of EKG. RADIOLOGY:   Non-plain film images such as CT, Ultrasound and MRI are read by the radiologist. Plain radiographic images are visualized andpreliminarily interpreted by the  ED Provider with the below findings:        Interpretation perthe Radiologist below, if available at the time of this note:    No orders to display     No results found. PROCEDURES   Unless otherwise noted below, none     Procedures    CRITICAL CARE TIME   Critical care provided for this patient of which 0 min were spend on critical care and decision making. 0 min spent on procedures.  There was imminent failure of an organ system which required critical intervention to prevent clinically significant progression of life threatening deterioration of the patient's condition to the point of disability or death.      CONSULTS:  None      EMERGENCY DEPARTMENT COURSE and DIFFERENTIAL DIAGNOSIS/MDM:   Vitals:    Vitals:    12/15/22 2031   BP: 107/71   Pulse: 75   Resp: 18   Temp: 97.4 °F (36.3 °C)   TempSrc: Oral   SpO2: 98%   Weight: 190 lb (86.2 kg)   Height: 4' 11\" (1.499 m)       Patient was given thefollowing medications:  Medications   amoxicillin (AMOXIL) capsule 500 mg (500 mg Oral Given 12/15/22 2051)       Is this patient to be included in the SEP-1 Core Measure due to severe sepsis or septic shock? No   Exclusion criteria - the patient is NOT to be included for SEP-1 Core Measure due to:  2+ SIRS criteria are not met       ED course  Patient presented to the ER for evaluation of right upper dental pain, she has a chipped tooth, tenderness to palpation also tenderness at the right upper gums, no obvious swelling no abscess or drainage. Oropharynx is clear. No signs of ludwigs, deep space infection or airway compromise. Started on amoxicillin. To continue ibuprofen and Tylenol for pain. To see a dentist soon as possible. Return for any worsening. I estimate there is LOW risk for a DEEP SPACE INFECTION (e.g., MANNYS ANGINA OR RETROPHARYNGEAL ABSCESS), MENINGITIS, or AIRWAY COMPROMISE, thus I consider the discharge disposition reasonable. Also, there is no evidence or peritonitis, sepsis, or toxicity. FINAL IMPRESSION      1.  Odontalgia          DISPOSITION/PLAN   DISPOSITION Decision to Discharge    PATIENT REFERREDTO:  Dentist    Schedule an appointment as soon as possible for a visit       [facility]  [address] [facility]  [phone]    If symptoms worsen      DISCHARGE MEDICATIONS:  New Prescriptions    AMOXICILLIN (AMOXIL) 500 MG CAPSULE    Take 1 capsule by mouth 3 times daily for 10 days       DISCONTINUED MEDICATIONS:  Discontinued Medications    No medications on file              (Please note that portions ofthis note were completed with a voice recognition program.  Efforts were made to edit the dictations but occasionally words are mis-transcribed.)    Malaika Lovett PA-C (electronically signed)            Kirsty Valenzuela PA-C  12/15/22 2052

## 2023-02-16 ENCOUNTER — HOSPITAL ENCOUNTER (EMERGENCY)
Age: 33
Discharge: HOME | End: 2023-02-16
Attending: EMERGENCY MEDICINE
Payer: COMMERCIAL

## 2023-02-16 VITALS
RESPIRATION RATE: 14 BRPM | BODY MASS INDEX: 40.32 KG/M2 | DIASTOLIC BLOOD PRESSURE: 86 MMHG | HEIGHT: 59 IN | WEIGHT: 200 LBS | OXYGEN SATURATION: 97 % | TEMPERATURE: 98.2 F | SYSTOLIC BLOOD PRESSURE: 136 MMHG | HEART RATE: 66 BPM

## 2023-02-16 DIAGNOSIS — T40.715A ADVERSE EFFECT OF CANNABIS, INITIAL ENCOUNTER: Primary | ICD-10-CM

## 2023-02-16 LAB
EKG ATRIAL RATE: 117 BPM
EKG DIAGNOSIS: NORMAL
EKG P AXIS: 45 DEGREES
EKG P-R INTERVAL: 158 MS
EKG Q-T INTERVAL: 316 MS
EKG QRS DURATION: 88 MS
EKG QTC CALCULATION (BAZETT): 440 MS
EKG R AXIS: 21 DEGREES
EKG T AXIS: 23 DEGREES
EKG VENTRICULAR RATE: 117 BPM
HCG QUALITATIVE: NEGATIVE

## 2023-02-16 PROCEDURE — 6360000002 HC RX W HCPCS: Performed by: EMERGENCY MEDICINE

## 2023-02-16 PROCEDURE — 99284 EMERGENCY DEPT VISIT MOD MDM: CPT

## 2023-02-16 PROCEDURE — 93005 ELECTROCARDIOGRAM TRACING: CPT | Performed by: EMERGENCY MEDICINE

## 2023-02-16 PROCEDURE — 96374 THER/PROPH/DIAG INJ IV PUSH: CPT

## 2023-02-16 PROCEDURE — 36415 COLL VENOUS BLD VENIPUNCTURE: CPT

## 2023-02-16 PROCEDURE — 2580000003 HC RX 258: Performed by: EMERGENCY MEDICINE

## 2023-02-16 PROCEDURE — 96375 TX/PRO/DX INJ NEW DRUG ADDON: CPT

## 2023-02-16 PROCEDURE — 84703 CHORIONIC GONADOTROPIN ASSAY: CPT

## 2023-02-16 PROCEDURE — 6370000000 HC RX 637 (ALT 250 FOR IP): Performed by: EMERGENCY MEDICINE

## 2023-02-16 PROCEDURE — 96361 HYDRATE IV INFUSION ADD-ON: CPT

## 2023-02-16 PROCEDURE — 93010 ELECTROCARDIOGRAM REPORT: CPT | Performed by: INTERNAL MEDICINE

## 2023-02-16 RX ORDER — ONDANSETRON 2 MG/ML
4 INJECTION INTRAMUSCULAR; INTRAVENOUS ONCE
Status: COMPLETED | OUTPATIENT
Start: 2023-02-16 | End: 2023-02-16

## 2023-02-16 RX ORDER — DROPERIDOL 2.5 MG/ML
1.25 INJECTION, SOLUTION INTRAMUSCULAR; INTRAVENOUS ONCE
Status: COMPLETED | OUTPATIENT
Start: 2023-02-16 | End: 2023-02-16

## 2023-02-16 RX ORDER — 0.9 % SODIUM CHLORIDE 0.9 %
500 INTRAVENOUS SOLUTION INTRAVENOUS ONCE
Status: COMPLETED | OUTPATIENT
Start: 2023-02-16 | End: 2023-02-16

## 2023-02-16 RX ORDER — HYDROXYZINE HYDROCHLORIDE 25 MG/1
25 TABLET, FILM COATED ORAL ONCE
Status: COMPLETED | OUTPATIENT
Start: 2023-02-16 | End: 2023-02-16

## 2023-02-16 RX ORDER — LORAZEPAM 1 MG/1
1 TABLET ORAL ONCE
Status: COMPLETED | OUTPATIENT
Start: 2023-02-16 | End: 2023-02-16

## 2023-02-16 RX ADMIN — ONDANSETRON 4 MG: 2 INJECTION INTRAMUSCULAR; INTRAVENOUS at 02:14

## 2023-02-16 RX ADMIN — LORAZEPAM 1 MG: 1 TABLET ORAL at 02:18

## 2023-02-16 RX ADMIN — HYDROXYZINE HYDROCHLORIDE 25 MG: 25 TABLET ORAL at 02:18

## 2023-02-16 RX ADMIN — SODIUM CHLORIDE 500 ML: 9 INJECTION, SOLUTION INTRAVENOUS at 02:14

## 2023-02-16 RX ADMIN — DROPERIDOL 1.25 MG: 2.5 INJECTION, SOLUTION INTRAMUSCULAR; INTRAVENOUS at 03:11

## 2023-02-16 ASSESSMENT — PAIN - FUNCTIONAL ASSESSMENT
PAIN_FUNCTIONAL_ASSESSMENT: NONE - DENIES PAIN

## 2023-02-16 NOTE — ED PROVIDER NOTES
Emergency Department Provider Note  Location: [place] [address] [place] [address],[address]  2023     Patient Identification  Funmi Garduno is a 27 y.o. female    Chief Complaint  Ingestion (Pt having anxiety symptoms after \"eating a gummy\", unknown how many milligrams of THC)      Mode of Arrival  private car    HPI  (History provided by patient)  This is a 27 y.o. female presented today for anxiety. Patient said she ate an edible gummy between 10:30p-11p. She never had one before. Shortly after eating the gummy, she felt her heart racing and also very anxious. No pain. + nausea. No vomiting. She reports sensation of \"cotton mouth\" but also afraid to drink water due to nausea. No diarrhea. No abdominal cramp. No other complaints, modifying factors or associated symptoms. I have reviewed the following nursing documentation:  Allergies: Allergies   Allergen Reactions    Toradol [Ketorolac Tromethamine]     Tramadol Itching and Nausea And Vomiting    Vicodin [Hydrocodone-Acetaminophen] Itching and Nausea Only       Past medical history:  has a past medical history of Abnormal Pap smear of cervix (), Anemia, Chlamydia, Gestational diabetes (), MRSA (methicillin resistant staph aureus) culture positive (10/20/2017), and Wrist fracture (age 8). Past surgical history:  has a past surgical history that includes Cholecystectomy (); [place] tooth extraction ();  section, low transverse (, , , , ); and Tubal ligation. Home medications: none reported    Social history:  reports that she has never smoked. She has never used smokeless tobacco. She reports that she does not drink alcohol and does not use drugs.     Family history:    Family History   Problem Relation Age of Onset    Asthma Mother     Miscarriages / [place] Mother        Exam  ED Triage Vitals [23 0149]   BP Temp Temp Source Heart Rate Resp SpO2 Height Weight   136/86 98.2 °F (36.8 °C) Oral (!) 124 18 99 % 4' 11\" (1.499 m) 200 lb (90.7 kg)   Physical Exam  Vitals and nursing note reviewed. Constitutional:       General: She is not in acute distress. Appearance: Normal appearance. She is well-developed. She is not diaphoretic. HENT:      Head: Normocephalic and atraumatic. Eyes:      General: Lids are normal. No scleral icterus. Right eye: No discharge. Left eye: No discharge. Extraocular Movements: Extraocular movements intact. Conjunctiva/sclera: Conjunctivae normal.      Pupils: Pupils are equal, round, and reactive to light. Comments: No nystagmus   Neck:      Trachea: No tracheal deviation. Cardiovascular:      Rate and Rhythm: Regular rhythm. Tachycardia present. Pulses: Normal pulses. Pulmonary:      Effort: Pulmonary effort is normal. No respiratory distress. Breath sounds: Normal breath sounds. No stridor. No wheezing. Abdominal:      General: There is no distension. Palpations: Abdomen is soft. Tenderness: There is no abdominal tenderness. There is no guarding or rebound. Musculoskeletal:         General: No deformity. Cervical back: Neck supple. Skin:     General: Skin is warm and dry. Neurological:      Mental Status: She is alert and oriented to person, place, and time. Cranial Nerves: No dysarthria or facial asymmetry. Motor: No tremor. Psychiatric:         Mood and Affect: Mood is anxious. Speech: Speech normal.         Behavior: Behavior is cooperative. MDM/ED Course  EKG  The Ekg interpreted by me in the absence of a cardiologist shows. sinus tachycardia, owwb=889  Axis is   Normal  QTc is  normal  Intervals and Durations are unremarkable. No specific ST-T wave changes appreciated. No evidence of acute ischemia.    No prior EKG available for comparison        Labs  Results for orders placed or performed during the hospital encounter of 02/16/23   HCG Qualitative, Serum   Result Value Ref Range hCG Qual Negative Detects HCG level >10 MIU/mL         - Patient seen and evaluated in room 4.  27 y.o. female presented for anxiety, nausea, dry mouth sensation after she took an edible gummy. She never had the edible gummy before. On exam, patient was tachycardic and anxious but otherwise no other abnormality found on exam. NS bolus given for tachycardia. - despite reassurance, patient was feeling extremely anxious and couldn't get lay in bed to rest even after atarax and ativan. I therefore ordered EKG to make sure no QT prolongation. Once QT confirmed to be normal, I ordered a dose of droperidol. Patient then slept. HR also improved significantly. - Patient was placed on telemetry during his/her ED stay and no malignant dysrhythmia observed. - Patient was given    ED Medication Orders (From admission, onward)      Start Ordered     Status Ordering Provider    02/16/23 0330 02/16/23 0302  droperidol (INAPSINE) injection 1.25 mg  ONCE         Last MAR action: Given - by Jana Dominique [facility]    02/16/23 0230 02/16/23 0203  0.9 % sodium chloride bolus  ONCE         Last MAR action: Stopped - by Jana Dominique on 02/16/23 at [facility]    02/16/23 0230 02/16/23 0203  ondansetron (ZOFRAN) injection 4 mg  ONCE         Last MAR action: Given - by Jana Dominique on 02/16/23 at [facility]    02/16/23 0230 02/16/23 0203  hydrOXYzine HCl (ATARAX) tablet 25 mg  ONCE         Last MAR action: Given - by Jana Dominique on 02/16/23 at [facility], [facility]    02/16/23 0230 02/16/23 0210  LORazepam (ATIVAN) tablet 1 mg  ONCE         Last MAR action: Given - by Jana Dominique on 02/16/23 at [address] [facility]            Upon reassessment after droperidol, Charlesnaveen Paola Banks said she felt much better and was ready to go home. - Return precautions also discussed. patient verbalized understanding of care plan and agreed to follow-up with PCP as advised.         - Is this patient to be included in the SEP-1 Core Measure due to severe sepsis or septic shock? No   Exclusion criteria - the patient is NOT to be included for SEP-1 Core Measure due to: Infection is not suspected    I estimate there is LOW risk for ACUTE RESPIRATORY FAILURE, ACUTE CORONARY SYNDROME, SEVERE COPD/ASTHMA EXACERBATION, ACUTE EXACERBATION OF CONGESTIVE HEART FAILURE, PERICARDIAL TAMPONADE, PNEUMONIA WITH HYPOXIA, PNEUMOTHORAX, PULMONARY EMBOLISM WITH RIGHT HEART STRAIN, SEPSIS, and THORACIC DISSECTION,  thus I consider the discharge disposition reasonable. Kip Banks and I have discussed the diagnosis and risks, and we agree with discharging home to follow-up with PCP. We also discussed returning to the Emergency Department immediately if new or worsening symptoms occur. We have discussed the symptoms which are most concerning (e.g., bloody sputum, fever, worsening pain or shortness of breath) that necessitate immediate return. Clinical Impression:  1. Adverse effect of cannabis, initial encounter          Disposition:  Discharge to home in good condition. Blood pressure 136/86, pulse 66, temperature 98.2 °F (36.8 °C), temperature source Oral, resp. rate 14, height 4' 11\" (1.499 m), weight 200 lb (90.7 kg), last menstrual period 01/31/2023, SpO2 97 %, not currently breastfeeding. Disposition referral (if applicable): Susan Aaron, APRN - CNP  [facility] [address]  [phone]    Schedule an appointment as soon as possible for a visit   As needed    Angela CELAYA am the primary attending of record and contributed the majority of evaluation and treatment of emergent care for this encounter. Total critical care time is 0 minutes, which excludes separately billable procedures and updating family. Time spent is specifically for management of the presenting complaint and symptoms initially, direct bedside care, reevaluation, review of records, and consultation.   There was a high probability of clinically significant life-threatening deterioration in the patient's condition, which required my urgent intervention. This chart was generated in part by using Dragon Dictation system and may contain errors related to that system including errors in grammar, punctuation, and spelling, as well as words and phrases that may be inappropriate. If there are any questions or concerns please feel free to contact the dictating provider for clarification.      Lynette Ruiz MD  [address] [facility] Jimmy Resendez MD  02/16/23 9513

## 2023-04-26 NOTE — ED NOTES
No other needs at this time     Emmy Lora, CONSUELO  07/17/19 2123 This office note has been dictated.  CONFIRMATION #:[id number]

## 2023-06-15 ENCOUNTER — APPOINTMENT (OUTPATIENT)
Dept: CT IMAGING | Age: 33
End: 2023-06-15
Payer: COMMERCIAL

## 2023-06-15 ENCOUNTER — APPOINTMENT (OUTPATIENT)
Dept: GENERAL RADIOLOGY | Age: 33
End: 2023-06-15
Payer: COMMERCIAL

## 2023-06-15 ENCOUNTER — HOSPITAL ENCOUNTER (EMERGENCY)
Age: 33
Discharge: HOME | End: 2023-06-15
Payer: COMMERCIAL

## 2023-06-15 VITALS
WEIGHT: 200 LBS | BODY MASS INDEX: 40.32 KG/M2 | DIASTOLIC BLOOD PRESSURE: 85 MMHG | SYSTOLIC BLOOD PRESSURE: 138 MMHG | RESPIRATION RATE: 16 BRPM | HEIGHT: 59 IN | OXYGEN SATURATION: 97 % | HEART RATE: 75 BPM | TEMPERATURE: 98.3 F

## 2023-06-15 DIAGNOSIS — R55 SYNCOPE AND COLLAPSE: ICD-10-CM

## 2023-06-15 DIAGNOSIS — R51.9 ACUTE NONINTRACTABLE HEADACHE, UNSPECIFIED HEADACHE TYPE: Primary | ICD-10-CM

## 2023-06-15 LAB
ALBUMIN SERPL-MCNC: 3.8 G/DL (ref 3.4–5)
ALBUMIN/GLOB SERPL: 1 {RATIO} (ref 1.1–2.2)
ALP SERPL-CCNC: 60 U/L (ref 40–129)
ALT SERPL-CCNC: 20 U/L (ref 10–40)
ANION GAP SERPL CALCULATED.3IONS-SCNC: 11 MMOL/L (ref 3–16)
AST SERPL-CCNC: 14 U/L (ref 15–37)
BASOPHILS # BLD: 0 K/UL (ref 0–0.2)
BASOPHILS NFR BLD: 0.3 %
BILIRUB SERPL-MCNC: <0.2 MG/DL (ref 0–1)
BUN SERPL-MCNC: 14 MG/DL (ref 7–20)
CALCIUM SERPL-MCNC: 8.9 MG/DL (ref 8.3–10.6)
CHLORIDE SERPL-SCNC: 104 MMOL/L (ref 99–110)
CO2 SERPL-SCNC: 24 MMOL/L (ref 21–32)
CREAT SERPL-MCNC: 0.6 MG/DL (ref 0.6–1.1)
DEPRECATED RDW RBC AUTO: 15.2 % (ref 12.4–15.4)
EOSINOPHIL # BLD: 0.1 K/UL (ref 0–0.6)
EOSINOPHIL NFR BLD: 1.1 %
GFR SERPLBLD CREATININE-BSD FMLA CKD-EPI: >60 ML/MIN/{1.73_M2}
GLUCOSE SERPL-MCNC: 109 MG/DL (ref 70–99)
HCG SERPL QL: NEGATIVE
HCT VFR BLD AUTO: 35.7 % (ref 36–48)
HGB BLD-MCNC: 11.7 G/DL (ref 12–16)
LYMPHOCYTES # BLD: 2.4 K/UL (ref 1–5.1)
LYMPHOCYTES NFR BLD: 38.2 %
MCH RBC QN AUTO: 26.5 PG (ref 26–34)
MCHC RBC AUTO-ENTMCNC: 32.8 G/DL (ref 31–36)
MCV RBC AUTO: 80.9 FL (ref 80–100)
MONOCYTES # BLD: 0.5 K/UL (ref 0–1.3)
MONOCYTES NFR BLD: 8.4 %
NEUTROPHILS # BLD: 3.2 K/UL (ref 1.7–7.7)
NEUTROPHILS NFR BLD: 52 %
PLATELET # BLD AUTO: 302 K/UL (ref 135–450)
PMV BLD AUTO: 7.6 FL (ref 5–10.5)
POTASSIUM SERPL-SCNC: 4.1 MMOL/L (ref 3.5–5.1)
PROT SERPL-MCNC: 7.5 G/DL (ref 6.4–8.2)
RBC # BLD AUTO: 4.42 M/UL (ref 4–5.2)
SODIUM SERPL-SCNC: 139 MMOL/L (ref 136–145)
WBC # BLD AUTO: 6.2 K/UL (ref 4–11)

## 2023-06-15 PROCEDURE — 6360000002 HC RX W HCPCS: Performed by: PHYSICIAN ASSISTANT

## 2023-06-15 PROCEDURE — 71045 X-RAY EXAM CHEST 1 VIEW: CPT

## 2023-06-15 PROCEDURE — 85025 COMPLETE CBC W/AUTO DIFF WBC: CPT

## 2023-06-15 PROCEDURE — 84703 CHORIONIC GONADOTROPIN ASSAY: CPT

## 2023-06-15 PROCEDURE — 93005 ELECTROCARDIOGRAM TRACING: CPT | Performed by: EMERGENCY MEDICINE

## 2023-06-15 PROCEDURE — 6370000000 HC RX 637 (ALT 250 FOR IP): Performed by: PHYSICIAN ASSISTANT

## 2023-06-15 PROCEDURE — 80053 COMPREHEN METABOLIC PANEL: CPT

## 2023-06-15 PROCEDURE — 96374 THER/PROPH/DIAG INJ IV PUSH: CPT

## 2023-06-15 PROCEDURE — 99285 EMERGENCY DEPT VISIT HI MDM: CPT

## 2023-06-15 PROCEDURE — 70450 CT HEAD/BRAIN W/O DYE: CPT

## 2023-06-15 PROCEDURE — 2580000003 HC RX 258: Performed by: PHYSICIAN ASSISTANT

## 2023-06-15 RX ORDER — PROCHLORPERAZINE EDISYLATE 5 MG/ML
10 INJECTION INTRAMUSCULAR; INTRAVENOUS EVERY 6 HOURS PRN
Status: DISCONTINUED | OUTPATIENT
Start: 2023-06-15 | End: 2023-06-16 | Stop reason: HOSPADM

## 2023-06-15 RX ORDER — 0.9 % SODIUM CHLORIDE 0.9 %
1000 INTRAVENOUS SOLUTION INTRAVENOUS ONCE
Status: COMPLETED | OUTPATIENT
Start: 2023-06-15 | End: 2023-06-15

## 2023-06-15 RX ORDER — ACETAMINOPHEN 500 MG
1000 TABLET ORAL ONCE
Status: COMPLETED | OUTPATIENT
Start: 2023-06-15 | End: 2023-06-15

## 2023-06-15 RX ADMIN — PROCHLORPERAZINE EDISYLATE 10 MG: 5 INJECTION INTRAMUSCULAR; INTRAVENOUS at 20:15

## 2023-06-15 RX ADMIN — SODIUM CHLORIDE 1000 ML: 9 INJECTION, SOLUTION INTRAVENOUS at 19:39

## 2023-06-15 RX ADMIN — ACETAMINOPHEN 1000 MG: 500 TABLET ORAL at 19:40

## 2023-06-15 ASSESSMENT — PAIN DESCRIPTION - DESCRIPTORS
DESCRIPTORS: ACHING

## 2023-06-15 ASSESSMENT — PAIN DESCRIPTION - LOCATION
LOCATION: HEAD

## 2023-06-15 ASSESSMENT — PAIN DESCRIPTION - FREQUENCY: FREQUENCY: CONTINUOUS

## 2023-06-15 ASSESSMENT — PAIN SCALES - GENERAL
PAINLEVEL_OUTOF10: 0
PAINLEVEL_OUTOF10: 7
PAINLEVEL_OUTOF10: 7

## 2023-06-15 ASSESSMENT — PAIN DESCRIPTION - PAIN TYPE
TYPE: ACUTE PAIN
TYPE: ACUTE PAIN

## 2023-06-15 ASSESSMENT — PAIN - FUNCTIONAL ASSESSMENT: PAIN_FUNCTIONAL_ASSESSMENT: 0-10

## 2023-06-16 LAB
EKG ATRIAL RATE: 75 BPM
EKG DIAGNOSIS: NORMAL
EKG P AXIS: 46 DEGREES
EKG P-R INTERVAL: 140 MS
EKG Q-T INTERVAL: 386 MS
EKG QRS DURATION: 98 MS
EKG QTC CALCULATION (BAZETT): 431 MS
EKG R AXIS: 23 DEGREES
EKG T AXIS: 30 DEGREES
EKG VENTRICULAR RATE: 75 BPM

## 2023-06-16 PROCEDURE — 93010 ELECTROCARDIOGRAM REPORT: CPT | Performed by: INTERNAL MEDICINE

## 2023-06-16 NOTE — ED PROVIDER NOTES
[address] [facility]  ED  EMERGENCY DEPARTMENT ENCOUNTER        Pt Name: Thomas Curiel  MRN: [id number]  [place] 1990  Date of evaluation: 6/15/2023  Provider: ANIL Camarillo  PCP: ACE Elkins CNP  Note Started: 11:54 PM EDT       MORRIS. I have evaluated this patient. CHIEF COMPLAINT       Chief Complaint   Patient presents with    Loss of Consciousness     Was at work, states she felt like she was going to pass out, annd then did. Vitals stable. Fingerstick 134. Did not hit head. HISTORY OF PRESENT ILLNESS      Chief Complaint: Loss of consciousness    Thomas Curiel is a 27 y.o. female who presents to the emergency department for evaluation of loss of consciousness. She was at work, and felt dizzy. She remembers was being in the office and someone was called in the ambulance. She reports having headache today, that got worse substantially after she passed out. She took some over-the-counter pain medications this morning prior to work, but they did not relieve her symptoms. She denies belly pain or back pain. No shortness of breath. No leg swelling. No nausea or vomiting. SCREENINGS    [place] Coma Scale  Eye Opening: Spontaneous  Best Verbal Response: Oriented  Best Motor Response: Obeys commands  Ian Coma Scale Score: 15      Is this patient to be included in the SEP-1 Core Measure due to severe sepsis or septic shock? No   Exclusion criteria - the patient is NOT to be included for SEP-1 Core Measure due to: Infection is not suspected      PHYSICAL EXAM     Vitals: /85   Pulse 75   Temp 98.3 °F (36.8 °C) (Oral)   Resp 16   Ht 4' 11\" (1.499 m)   Wt 200 lb (90.7 kg)   SpO2 97%   BMI 40.40 kg/m²    General: awake, alert, no apparent distress  Pupils: equal, reactive  Head: Non-traumatic  Heart: Rate as noted, regular rhythm, no murmur or rubs.   Chest/Lungs: CTAB, no wheezes or crackles  Abdomen: soft, nondistended, no tenderness to palpation

## 2023-08-03 ENCOUNTER — APPOINTMENT (OUTPATIENT)
Dept: GENERAL RADIOLOGY | Age: 33
End: 2023-08-03
Payer: COMMERCIAL

## 2023-08-03 ENCOUNTER — HOSPITAL ENCOUNTER (EMERGENCY)
Age: 33
Discharge: HOME | End: 2023-08-03
Attending: EMERGENCY MEDICINE
Payer: COMMERCIAL

## 2023-08-03 VITALS
RESPIRATION RATE: 14 BRPM | DIASTOLIC BLOOD PRESSURE: 77 MMHG | HEART RATE: 94 BPM | SYSTOLIC BLOOD PRESSURE: 130 MMHG | BODY MASS INDEX: 39.92 KG/M2 | TEMPERATURE: 97.8 F | WEIGHT: 198 LBS | HEIGHT: 59 IN | OXYGEN SATURATION: 99 %

## 2023-08-03 DIAGNOSIS — S63.502A SPRAIN OF LEFT WRIST, INITIAL ENCOUNTER: Primary | ICD-10-CM

## 2023-08-03 PROCEDURE — 73110 X-RAY EXAM OF WRIST: CPT

## 2023-08-03 PROCEDURE — 6370000000 HC RX 637 (ALT 250 FOR IP): Performed by: EMERGENCY MEDICINE

## 2023-08-03 PROCEDURE — 99283 EMERGENCY DEPT VISIT LOW MDM: CPT

## 2023-08-03 RX ORDER — PREDNISONE 20 MG/1
40 TABLET ORAL ONCE
Status: COMPLETED | OUTPATIENT
Start: 2023-08-03 | End: 2023-08-03

## 2023-08-03 RX ORDER — PREDNISONE 20 MG/1
40 TABLET ORAL DAILY
Qty: 10 TABLET | Refills: 0 | Status: SHIPPED | OUTPATIENT
Start: 2023-08-03 | End: 2023-08-08

## 2023-08-03 RX ORDER — ACETAMINOPHEN 325 MG/1
650 TABLET ORAL ONCE
Status: COMPLETED | OUTPATIENT
Start: 2023-08-03 | End: 2023-08-03

## 2023-08-03 RX ADMIN — ACETAMINOPHEN 650 MG: 325 TABLET ORAL at 17:34

## 2023-08-03 RX ADMIN — PREDNISONE 40 MG: 20 TABLET ORAL at 17:34

## 2023-08-03 ASSESSMENT — PAIN - FUNCTIONAL ASSESSMENT
PAIN_FUNCTIONAL_ASSESSMENT: 0-10
PAIN_FUNCTIONAL_ASSESSMENT: PREVENTS OR INTERFERES WITH MANY ACTIVE NOT PASSIVE ACTIVITIES

## 2023-08-03 ASSESSMENT — LIFESTYLE VARIABLES
HOW OFTEN DO YOU HAVE A DRINK CONTAINING ALCOHOL: NEVER
HOW MANY STANDARD DRINKS CONTAINING ALCOHOL DO YOU HAVE ON A TYPICAL DAY: PATIENT DOES NOT DRINK

## 2023-08-03 ASSESSMENT — PAIN DESCRIPTION - PAIN TYPE: TYPE: ACUTE PAIN

## 2023-08-03 ASSESSMENT — PAIN SCALES - GENERAL
PAINLEVEL_OUTOF10: 5
PAINLEVEL_OUTOF10: 5

## 2023-08-03 ASSESSMENT — PAIN DESCRIPTION - FREQUENCY: FREQUENCY: CONTINUOUS

## 2023-08-03 ASSESSMENT — PAIN DESCRIPTION - DESCRIPTORS: DESCRIPTORS: BURNING

## 2023-08-03 ASSESSMENT — PAIN DESCRIPTION - LOCATION: LOCATION: WRIST

## 2023-08-03 ASSESSMENT — PAIN DESCRIPTION - ONSET: ONSET: SUDDEN

## 2023-08-03 ASSESSMENT — PAIN DESCRIPTION - ORIENTATION: ORIENTATION: LEFT

## 2023-08-03 NOTE — DISCHARGE INSTRUCTIONS
Your x-ray did not show any obvious fractures. Please wear your splint as needed for pain control. Please follow-up with your family doctor regarding visit here today.   If your symptoms worsen despite treatment please come back to the ER for repeat evaluation

## 2023-08-03 NOTE — ED NOTES
AVS provided and reviewed with the patient. The patient verbalized understanding of care at home, follow up care, and emergent symptoms to return for. The patient verbalized understanding of completing entire medication course as prescribed. No questions or concerns verbalized at this time. The patient is alert, oriented, stable, and ambulatory out of the department at the time of discharge.        Billy Ramos RN  08/03/23 9440

## 2023-09-05 ENCOUNTER — APPOINTMENT (OUTPATIENT)
Dept: GENERAL RADIOLOGY | Age: 33
End: 2023-09-05
Payer: COMMERCIAL

## 2023-09-05 ENCOUNTER — HOSPITAL ENCOUNTER (EMERGENCY)
Age: 33
Discharge: HOME | End: 2023-09-05
Payer: COMMERCIAL

## 2023-09-05 VITALS
SYSTOLIC BLOOD PRESSURE: 118 MMHG | TEMPERATURE: 98 F | BODY MASS INDEX: 40.32 KG/M2 | RESPIRATION RATE: 16 BRPM | HEART RATE: 78 BPM | DIASTOLIC BLOOD PRESSURE: 68 MMHG | WEIGHT: 200 LBS | OXYGEN SATURATION: 98 % | HEIGHT: 59 IN

## 2023-09-05 DIAGNOSIS — S69.91XA RIGHT WRIST INJURY, INITIAL ENCOUNTER: Primary | ICD-10-CM

## 2023-09-05 DIAGNOSIS — M25.531 RIGHT WRIST PAIN: ICD-10-CM

## 2023-09-05 PROCEDURE — 99283 EMERGENCY DEPT VISIT LOW MDM: CPT

## 2023-09-05 PROCEDURE — 6370000000 HC RX 637 (ALT 250 FOR IP): Performed by: PHYSICIAN ASSISTANT

## 2023-09-05 PROCEDURE — 73110 X-RAY EXAM OF WRIST: CPT

## 2023-09-05 RX ORDER — IBUPROFEN 600 MG/1
600 TABLET ORAL ONCE
Status: COMPLETED | OUTPATIENT
Start: 2023-09-05 | End: 2023-09-05

## 2023-09-05 RX ADMIN — IBUPROFEN 600 MG: 600 TABLET ORAL at 17:01

## 2023-09-05 ASSESSMENT — PAIN DESCRIPTION - ONSET: ONSET: SUDDEN

## 2023-09-05 ASSESSMENT — PAIN DESCRIPTION - DESCRIPTORS: DESCRIPTORS: BURNING

## 2023-09-05 ASSESSMENT — PAIN DESCRIPTION - FREQUENCY: FREQUENCY: CONTINUOUS

## 2023-09-05 ASSESSMENT — PAIN DESCRIPTION - ORIENTATION: ORIENTATION: RIGHT

## 2023-09-05 ASSESSMENT — PAIN DESCRIPTION - LOCATION: LOCATION: WRIST

## 2023-09-05 ASSESSMENT — PAIN SCALES - GENERAL: PAINLEVEL_OUTOF10: 8

## 2023-09-05 ASSESSMENT — PAIN - FUNCTIONAL ASSESSMENT
PAIN_FUNCTIONAL_ASSESSMENT: PREVENTS OR INTERFERES SOME ACTIVE ACTIVITIES AND ADLS
PAIN_FUNCTIONAL_ASSESSMENT: 0-10

## 2023-09-05 ASSESSMENT — PAIN DESCRIPTION - PAIN TYPE: TYPE: ACUTE PAIN

## 2023-09-05 NOTE — ED PROVIDER NOTES
generalized tenderness at right wrist with mild swelling. No deformity. Neurovascular intact. Ibuprofen given for pain. Will obtain x-rays to evaluate for fracture. Patient is stable. Differential diagnosis includes but is not limited to wrist sprain, fracture. X-rays right wrist interpreted by radiologist and reviewed by myself no definite fracture seen. No dislocation. I did discuss with her limitations of plain film x-rays and need for follow-up if her symptoms or not improving rule out occult injury not evident on the x-ray she understands. She will be placed in a Velcro thumb spica brace and advised rest ice elevate Tylenol and ibuprofen and was referred to orthopedics as needed. I estimate there is LOW risk for COMPARTMENT SYNDROME,  SEPTIC ARTHRITIS, TENDON OR NEUROVASCULAR INJURY, thus I consider the discharge disposition reasonable. I am the Primary Clinician of Record. FINAL IMPRESSION      1. Right wrist injury, initial encounter    2.  Right wrist pain          DISPOSITION/PLAN     DISPOSITION Decision to Discharge    PATIENT REFERRED TO:  Nimo Najera, APRN - CNP  [address]  [place] [address] [facility]  [phone]    In 1 week      Lloyd Reynolds[address]  [phone]      orthopedics, as needed      DISCHARGE MEDICATIONS:  New Prescriptions    No medications on file       DISCONTINUED MEDICATIONS:  Discontinued Medications    No medications on file              (Please note that portions of this note were completed with a voice recognition program.  Efforts were made to edit the dictations but occasionally words are mis-transcribed.)    Ambika Rutledge PA-C (electronically signed)           Priyank Sanders PA-C  09/05/23 0084

## 2023-09-05 NOTE — ED NOTES
AVS provided and reviewed with the patient. The patient verbalized understanding of care at home, follow up care, and emergent symptoms to return for. No questions or concerns verbalized at this time. The patient is alert, oriented, stable, and ambulatory out of the department at the time of discharge.        Debby Mccall RN  09/05/23 1612

## 2023-09-05 NOTE — DISCHARGE INSTRUCTIONS
Rest. Ice. Elevate. Tylenol or ibuprofen as needed for pain. Orthopedic referral provided as needed for further evaluation if symptoms not improving.

## 2023-09-06 ENCOUNTER — OFFICE VISIT (OUTPATIENT)
Dept: ENT CLINIC | Age: 33
End: 2023-09-06
Payer: COMMERCIAL

## 2023-09-06 VITALS
DIASTOLIC BLOOD PRESSURE: 68 MMHG | WEIGHT: 200 LBS | TEMPERATURE: 98 F | BODY MASS INDEX: 40.32 KG/M2 | SYSTOLIC BLOOD PRESSURE: 118 MMHG | HEART RATE: 78 BPM | HEIGHT: 59 IN | OXYGEN SATURATION: 98 %

## 2023-09-06 DIAGNOSIS — R42 VERTIGO: Primary | ICD-10-CM

## 2023-09-06 DIAGNOSIS — G43.909 MIGRAINE WITHOUT STATUS MIGRAINOSUS, NOT INTRACTABLE, UNSPECIFIED MIGRAINE TYPE: ICD-10-CM

## 2023-09-06 PROCEDURE — 1036F TOBACCO NON-USER: CPT | Performed by: OTOLARYNGOLOGY

## 2023-09-06 PROCEDURE — G8417 CALC BMI ABV UP PARAM F/U: HCPCS | Performed by: OTOLARYNGOLOGY

## 2023-09-06 PROCEDURE — 99203 OFFICE O/P NEW LOW 30 MIN: CPT | Performed by: OTOLARYNGOLOGY

## 2023-09-06 PROCEDURE — G8427 DOCREV CUR MEDS BY ELIG CLIN: HCPCS | Performed by: OTOLARYNGOLOGY

## 2023-09-06 RX ORDER — ONDANSETRON 4 MG/1
TABLET, FILM COATED ORAL
COMMUNITY
Start: 2023-08-14

## 2023-09-06 RX ORDER — MECLIZINE HYDROCHLORIDE 25 MG/1
TABLET ORAL
COMMUNITY
Start: 2023-08-14

## 2023-09-06 ASSESSMENT — ENCOUNTER SYMPTOMS
SORE THROAT: 0
FACIAL SWELLING: 0
SINUS PRESSURE: 0
TROUBLE SWALLOWING: 0
VOICE CHANGE: 0
COUGH: 0
APNEA: 0
EYE ITCHING: 0
SHORTNESS OF BREATH: 0

## 2023-09-18 ENCOUNTER — TELEPHONE (OUTPATIENT)
Dept: ENT CLINIC | Age: 33
End: 2023-09-18

## 2023-09-18 ENCOUNTER — PROCEDURE VISIT (OUTPATIENT)
Dept: AUDIOLOGY | Age: 33
End: 2023-09-18
Payer: COMMERCIAL

## 2023-09-18 DIAGNOSIS — H93.8X3 EAR PRESSURE, BILATERAL: ICD-10-CM

## 2023-09-18 DIAGNOSIS — R42 DIZZINESS AND GIDDINESS: ICD-10-CM

## 2023-09-18 DIAGNOSIS — R42 DIZZINESS AND GIDDINESS: Primary | ICD-10-CM

## 2023-09-18 DIAGNOSIS — R42 VERTIGO: Primary | ICD-10-CM

## 2023-09-18 DIAGNOSIS — H90.41 SENSORINEURAL HEARING LOSS (SNHL) OF RIGHT EAR WITH UNRESTRICTED HEARING OF LEFT EAR: Primary | ICD-10-CM

## 2023-09-18 DIAGNOSIS — G43.909 MIGRAINE WITHOUT STATUS MIGRAINOSUS, NOT INTRACTABLE, UNSPECIFIED MIGRAINE TYPE: Primary | ICD-10-CM

## 2023-09-18 DIAGNOSIS — H93.11 TINNITUS OF RIGHT EAR: ICD-10-CM

## 2023-09-18 PROCEDURE — 92557 COMPREHENSIVE HEARING TEST: CPT | Performed by: AUDIOLOGIST

## 2023-09-18 PROCEDURE — 92540 BASIC VESTIBULAR EVALUATION: CPT | Performed by: AUDIOLOGIST

## 2023-09-18 PROCEDURE — 92567 TYMPANOMETRY: CPT | Performed by: AUDIOLOGIST

## 2023-09-18 PROCEDURE — 92537 CALORIC VSTBLR TEST W/REC: CPT | Performed by: AUDIOLOGIST

## 2023-09-18 NOTE — PROGRESS NOTES
Jeimy Banks   1990, 27 y.o. female   [id number]       Referring Provider: Nayeli Valladares DO  Referral Type: In an order in [address] [place] [address]    Reason for Visit: Evaluation of the cause of disorders of hearing, tinnitus, or balance. ADULT AUDIOLOGIC EVALUATION      Geovanna Ramirez is a 27 y.o. female seen today, 9/18/2023 , for an initial audiologic evaluation. Patient was alone. AUDIOLOGIC AND OTHER PERTINENT MEDICAL HISTORY:      Jeimy Banks noted aural fullness, tinnitus, and dizziness. Patient reports motion sickness with headaches lasting on and off all day beginning a few months ago. She noted bilateral aural fullness inferior to pinnas. Patient mentioned having intermittent right ear tinnitus. Jeimy Banks denied otalgia, history of occupational/recreational noise exposure, history of head trauma, history of ear surgery, and family history of hearing loss. Date: 9/18/2023     IMPRESSIONS:      Normal middle ear pressure and compliance, bilaterally. Normal to near normal hearing sensitivity with with excellent word understanding in normal conversation level, bilaterally. Follow medical recommendations of Nayeli Valladares DO.     ASSESSMENT AND FINDINGS:     Otoscopy revealed: Clear ear canals bilaterally    RIGHT EAR:  Hearing Sensitivity: Normal hearing sensitivity to a mild sensorineural hearing loss from 250-8000 Hz  Speech Recognition Threshold: 20 dB HL  Word Recognition:Excellent (100%), based on NU-6 25-word list at 55 dBHL using recorded speech stimuli. Tympanometry: Normal peak pressure and compliance, Type A tympanogram, consistent with normal middle ear function. LEFT EAR:  Hearing Sensitivity:Hearing sensitivity within normal limits from 250-8000 Hz  Speech Recognition Threshold: 15 dB HL  Word Recognition: Excellent (100%), based on NU-6 25-word list at 50 dBHL using recorded speech stimuli.     Tympanometry: Normal peak pressure and compliance, Type A tympanogram, consistent with

## 2023-09-18 NOTE — PROGRESS NOTES
Crissy Banks  1990, 27 y.o. female   [id number]     Referring Provider: Eduard Willoughby DO  Referral Type: In an order in [address] [place] [address]    Reason for Visit: Evaluation of the cause of disorders of balance      VESTIBULAR EVALUATION - VIDEONYSTAGMOGRAPHY (VNG)    Crissy Banks was seen today, 9/18/2023, for videonystagmography (VNG) evaluation. The VNG is an examination of the central vestibulo-ocular pathway that is measured via eye movements. IMPRESSIONS:      Normal VNG. Caloric irrigations and all other sub test results within normal limits. Follow medical recommendations of Eduard Willoughby DO. See scanned for full report 9/18/2023        VESTIBULAR/AUDIOLOGIC AND PERTINENT MEDICAL HISTORY:      Chief Complaint/Description of Symptoms:   Description: true vertigo, loss of balance, headache, nausea, vomiting  Onset: 1.5 months    Duration: hours   Frequency: 1-2x per day  Symptoms unchanged since onset. Symptoms alleviated by: Patient reports improves after eating- hour after eating returns     Symptoms made worse by: More working and movement worse it gets     Patient's current ranking of dizziness/imbalance/unsteadiness on a scale of 0-10 for today: 5/10    Activities that provoke or aggravate symptoms and other associated symptoms:  Positional Changes: standing up from sitting, bending down  Sensory/Gait Disturbances: driving car, scrolling on phone, reading, computer/laptop, watching TV  Pressure/Sound Induced Vertigo/Dizziness/Imbalance: No significant factors reported  Psychological Factors: anxiety  Cardiovascular Factors: increased heart rate and blood pressure     Previous History of Dizziness:    Previous VNG or balance assessment: no   Previous balance therapy: no    Neck Pain/Stiffness (Orthopedic): No significant factors reported. Neurological Factors:  No significant factors reported.      Audiologic/Otologic History:  Last audiogram: 9/18/23, Results: Essentially symmetric hearing loss with

## 2023-09-18 NOTE — TELEPHONE ENCOUNTER
Please let patient know that her VNG, balance test, was normal. No evidence of weakness in balance system. I suspect her symptoms are secondary to underlying migraine.  I recommend she follow up with neurologist, if she wants a referral with us you can put one in for Dr Db Correa

## 2023-10-19 ENCOUNTER — HOSPITAL ENCOUNTER (EMERGENCY)
Age: 33
Discharge: HOME | End: 2023-10-19
Attending: EMERGENCY MEDICINE
Payer: COMMERCIAL

## 2023-10-19 ENCOUNTER — APPOINTMENT (OUTPATIENT)
Dept: CT IMAGING | Age: 33
End: 2023-10-19
Payer: COMMERCIAL

## 2023-10-19 VITALS
OXYGEN SATURATION: 100 % | WEIGHT: 200 LBS | SYSTOLIC BLOOD PRESSURE: 130 MMHG | RESPIRATION RATE: 16 BRPM | TEMPERATURE: 98 F | HEART RATE: 65 BPM | BODY MASS INDEX: 40.32 KG/M2 | DIASTOLIC BLOOD PRESSURE: 88 MMHG | HEIGHT: 59 IN

## 2023-10-19 DIAGNOSIS — S39.012A STRAIN OF LUMBAR REGION, INITIAL ENCOUNTER: Primary | ICD-10-CM

## 2023-10-19 LAB
AMORPH SED URNS QL MICRO: ABNORMAL /HPF
ANION GAP SERPL CALCULATED.3IONS-SCNC: 11 MMOL/L (ref 3–16)
BACTERIA URNS QL MICRO: ABNORMAL /HPF
BASOPHILS # BLD: 0.3 K/UL (ref 0–0.2)
BASOPHILS NFR BLD: 3 %
BILIRUB UR QL STRIP.AUTO: NEGATIVE
BUN SERPL-MCNC: 26 MG/DL (ref 7–20)
CALCIUM SERPL-MCNC: 9.9 MG/DL (ref 8.3–10.6)
CHLORIDE SERPL-SCNC: 103 MMOL/L (ref 99–110)
CLARITY UR: CLEAR
CO2 SERPL-SCNC: 26 MMOL/L (ref 21–32)
COLOR UR: YELLOW
CREAT SERPL-MCNC: 0.7 MG/DL (ref 0.6–1.1)
DEPRECATED RDW RBC AUTO: 14.4 % (ref 12.4–15.4)
EOSINOPHIL # BLD: 0.3 K/UL (ref 0–0.6)
EOSINOPHIL NFR BLD: 2.7 %
EPI CELLS #/AREA URNS HPF: ABNORMAL /HPF (ref 0–5)
GFR SERPLBLD CREATININE-BSD FMLA CKD-EPI: >60 ML/MIN/{1.73_M2}
GLUCOSE SERPL-MCNC: 96 MG/DL (ref 70–99)
GLUCOSE UR STRIP.AUTO-MCNC: NEGATIVE MG/DL
HCG UR QL: NEGATIVE
HCT VFR BLD AUTO: 36.3 % (ref 36–48)
HGB BLD-MCNC: 12.2 G/DL (ref 12–16)
HGB UR QL STRIP.AUTO: ABNORMAL
KETONES UR STRIP.AUTO-MCNC: NEGATIVE MG/DL
LEUKOCYTE ESTERASE UR QL STRIP.AUTO: NEGATIVE
LYMPHOCYTES # BLD: 2.8 K/UL (ref 1–5.1)
LYMPHOCYTES NFR BLD: 27.1 %
MCH RBC QN AUTO: 27.6 PG (ref 26–34)
MCHC RBC AUTO-ENTMCNC: 33.5 G/DL (ref 31–36)
MCV RBC AUTO: 82.5 FL (ref 80–100)
MONOCYTES # BLD: 0.7 K/UL (ref 0–1.3)
MONOCYTES NFR BLD: 6.5 %
NEUTROPHILS # BLD: 6.3 K/UL (ref 1.7–7.7)
NEUTROPHILS NFR BLD: 60.7 %
NITRITE UR QL STRIP.AUTO: NEGATIVE
PH UR STRIP.AUTO: 5.5 [PH] (ref 5–8)
PLATELET # BLD AUTO: 308 K/UL (ref 135–450)
PMV BLD AUTO: 8 FL (ref 5–10.5)
POTASSIUM SERPL-SCNC: 4.1 MMOL/L (ref 3.5–5.1)
PROT UR STRIP.AUTO-MCNC: NEGATIVE MG/DL
RBC # BLD AUTO: 4.4 M/UL (ref 4–5.2)
RBC #/AREA URNS HPF: ABNORMAL /HPF (ref 0–4)
SODIUM SERPL-SCNC: 140 MMOL/L (ref 136–145)
SP GR UR STRIP.AUTO: <=1.005 (ref 1–1.03)
UA COMPLETE W REFLEX CULTURE PNL UR: ABNORMAL
UA DIPSTICK W REFLEX MICRO PNL UR: YES
URN SPEC COLLECT METH UR: ABNORMAL
UROBILINOGEN UR STRIP-ACNC: 0.2 E.U./DL
WBC # BLD AUTO: 10.4 K/UL (ref 4–11)
WBC #/AREA URNS HPF: ABNORMAL /HPF (ref 0–5)

## 2023-10-19 PROCEDURE — 85025 COMPLETE CBC W/AUTO DIFF WBC: CPT

## 2023-10-19 PROCEDURE — 84703 CHORIONIC GONADOTROPIN ASSAY: CPT

## 2023-10-19 PROCEDURE — 6370000000 HC RX 637 (ALT 250 FOR IP): Performed by: EMERGENCY MEDICINE

## 2023-10-19 PROCEDURE — 80048 BASIC METABOLIC PNL TOTAL CA: CPT

## 2023-10-19 PROCEDURE — 81001 URINALYSIS AUTO W/SCOPE: CPT

## 2023-10-19 PROCEDURE — 36415 COLL VENOUS BLD VENIPUNCTURE: CPT

## 2023-10-19 PROCEDURE — 74176 CT ABD & PELVIS W/O CONTRAST: CPT

## 2023-10-19 PROCEDURE — 99284 EMERGENCY DEPT VISIT MOD MDM: CPT

## 2023-10-19 RX ORDER — OXYCODONE HYDROCHLORIDE AND ACETAMINOPHEN 5; 325 MG/1; MG/1
1 TABLET ORAL ONCE
Status: COMPLETED | OUTPATIENT
Start: 2023-10-19 | End: 2023-10-19

## 2023-10-19 RX ORDER — IBUPROFEN 600 MG/1
600 TABLET ORAL 3 TIMES DAILY PRN
Qty: 30 TABLET | Refills: 0 | Status: SHIPPED | OUTPATIENT
Start: 2023-10-19

## 2023-10-19 RX ORDER — METHOCARBAMOL 750 MG/1
750 TABLET, FILM COATED ORAL 4 TIMES DAILY
Qty: 40 TABLET | Refills: 0 | Status: SHIPPED | OUTPATIENT
Start: 2023-10-19 | End: 2023-10-29

## 2023-10-19 RX ADMIN — OXYCODONE AND ACETAMINOPHEN 1 TABLET: 5; 325 TABLET ORAL at 22:23

## 2023-10-19 ASSESSMENT — PAIN DESCRIPTION - FREQUENCY: FREQUENCY: CONTINUOUS

## 2023-10-19 ASSESSMENT — PAIN DESCRIPTION - ORIENTATION: ORIENTATION: LOWER

## 2023-10-19 ASSESSMENT — PAIN SCALES - GENERAL
PAINLEVEL_OUTOF10: 8
PAINLEVEL_OUTOF10: 8

## 2023-10-19 ASSESSMENT — PAIN DESCRIPTION - ONSET: ONSET: GRADUAL

## 2023-10-19 ASSESSMENT — PAIN DESCRIPTION - LOCATION
LOCATION: BACK
LOCATION: BACK

## 2023-10-19 ASSESSMENT — PAIN DESCRIPTION - DESCRIPTORS: DESCRIPTORS: CRAMPING;DULL

## 2023-10-19 ASSESSMENT — PAIN DESCRIPTION - PAIN TYPE: TYPE: ACUTE PAIN

## 2023-10-19 ASSESSMENT — PAIN - FUNCTIONAL ASSESSMENT: PAIN_FUNCTIONAL_ASSESSMENT: 0-10

## 2023-10-20 NOTE — ED PROVIDER NOTES
[facility]      Pt Name: Yenifer Portillo  MRN: [id number]  [address] [place] 1990  Date of evaluation: 10/19/2023  Provider: Figueroa Whitley MD    [address]       Chief Complaint   Patient presents with    Back Pain     Pt states bilateral lower back pain for a few hours. Pt states pain started as cramping and turned dull         HISTORY OF PRESENT ILLNESS   (Location/Symptom, Timing/Onset, Context/Setting, Quality, Duration, Modifying Factors, Severity)  Note limiting factors. Yenifer Portillo is a 27 y.o. female with past medical history of no significant illness here today with back pain. Patient states that a few hours ago she began to have an aching pain that was dull in nature and occasionally cramping in her suprapubic region that is since traveled around to her lower back and now is more present in the left flank. No nausea vomiting fevers or chills. No chest pain, cough or shortness of breath. Denies dysuria or hematuria. No vaginal bleeding or discharge. She is never had kidney stones to her knowledge but was talked to a family member who was concerned that this may be the etiology of her symptoms. She no longer has any abdominal discomfort. Urinating freely. Worse with certain movements and motions such as bending over. Denies trauma or injury. No numbness, tingling or weakness in lower extremities    HPI    Nursing Notes were reviewed. REVIEW OF SYSTEMS    (2-9 systems for level 4, 10 or more for level 5)     Review of Systems    Please see HPI for pertinent positive and negative review of system findings. A full 10 system ROS was performed and otherwise negative.         PAST MEDICAL HISTORY     Past Medical History:   Diagnosis Date    Abnormal Pap smear of cervix     normal since    Anemia     after previous     Chlamydia     Remote    Gestational diabetes     MRSA (methicillin resistant staph aureus) culture positive

## 2023-11-22 ENCOUNTER — OFFICE VISIT (OUTPATIENT)
Age: 33
End: 2023-11-22
Payer: COMMERCIAL

## 2023-11-22 VITALS
RESPIRATION RATE: 14 BRPM | HEART RATE: 73 BPM | OXYGEN SATURATION: 97 % | WEIGHT: 200 LBS | HEIGHT: 59 IN | SYSTOLIC BLOOD PRESSURE: 124 MMHG | BODY MASS INDEX: 40.32 KG/M2 | DIASTOLIC BLOOD PRESSURE: 76 MMHG

## 2023-11-22 DIAGNOSIS — H81.4 VERTIGO OF CENTRAL ORIGIN: ICD-10-CM

## 2023-11-22 DIAGNOSIS — G44.40 MEDICATION OVERUSE HEADACHE: ICD-10-CM

## 2023-11-22 DIAGNOSIS — G43.719 INTRACTABLE CHRONIC MIGRAINE WITHOUT AURA AND WITHOUT STATUS MIGRAINOSUS: Primary | ICD-10-CM

## 2023-11-22 PROCEDURE — 1036F TOBACCO NON-USER: CPT | Performed by: PSYCHIATRY & NEUROLOGY

## 2023-11-22 PROCEDURE — G8417 CALC BMI ABV UP PARAM F/U: HCPCS | Performed by: PSYCHIATRY & NEUROLOGY

## 2023-11-22 PROCEDURE — G8484 FLU IMMUNIZE NO ADMIN: HCPCS | Performed by: PSYCHIATRY & NEUROLOGY

## 2023-11-22 PROCEDURE — G8427 DOCREV CUR MEDS BY ELIG CLIN: HCPCS | Performed by: PSYCHIATRY & NEUROLOGY

## 2023-11-22 PROCEDURE — 99204 OFFICE O/P NEW MOD 45 MIN: CPT | Performed by: PSYCHIATRY & NEUROLOGY

## 2023-11-22 RX ORDER — NORTRIPTYLINE HYDROCHLORIDE 10 MG/1
10 CAPSULE ORAL NIGHTLY
Qty: 30 CAPSULE | Refills: 3 | Status: SHIPPED | OUTPATIENT
Start: 2023-11-22 | End: 2023-11-22 | Stop reason: SDUPTHER

## 2023-11-22 RX ORDER — SUMATRIPTAN 50 MG/1
50 TABLET, FILM COATED ORAL
Qty: 10 TABLET | Refills: 2 | Status: SHIPPED | OUTPATIENT
Start: 2023-11-22 | End: 2023-11-22

## 2023-11-22 RX ORDER — NORTRIPTYLINE HYDROCHLORIDE 10 MG/1
10 CAPSULE ORAL NIGHTLY
Qty: 30 CAPSULE | Refills: 2 | Status: SHIPPED | OUTPATIENT
Start: 2023-11-22

## 2023-11-22 NOTE — PROGRESS NOTES
Neurology new clinic note    Patient name: Josefina Banks      Chief Complaint:  Refractory headache. History of present illness: This is a 26years old right-handed female. The patient is here for evaluation of refractory headache. The patient was seen by my ENT colleagues, Dr. Eunice Mina prior to this visit due to recurrent vertigo. The patient had normal VNG and audiogram.  The patient reports occasional vertigo with head motion. But she also reports frequent headache with vertigo as well. The patient has had chronic intermittent headache for many years. But her headache has been significantly worse over the last 3 to 4 years. Per the patient, it is also related to stressful situation especially at work. The patient describes her headache as dull aching pain over the bilateral frontal area with moderate to severe intensity. The patient reports nauseous, vertigo, phonophobia and photophobia with headache. The patient denies focal weakness or numbness. The headache can last all day long without medication. It occurs at least 3-4 times a week. The patient had CT brain this year which showed no significant intracranial abnormality. The patient is noted for recent kidney stone.     Past medical history:    Past Medical History:   Diagnosis Date    Abnormal Pap smear of cervix     normal since    Anemia     after previous     Chlamydia     Remote    Gestational diabetes     MRSA (methicillin resistant staph aureus) culture positive 10/20/2017    C section  - abdomen    Wrist fracture age 8    right wrist; fell off [employer]       Past surgical history:    Past Surgical History:   Procedure Laterality Date     SECTION, LOW TRANSVERSE  2006, , , ,     hx 5 C/S    CHOLECYSTECTOMY  2009    TUBAL LIGATION      WISDOM TOOTH EXTRACTION          Medication:    Current Outpatient Medications   Medication Sig Dispense Refill    SUMAtriptan (IMITREX) 50 MG tablet Take 1

## 2024-01-22 ENCOUNTER — TELEPHONE (OUTPATIENT)
Age: 34
End: 2024-01-22

## 2024-01-22 NOTE — TELEPHONE ENCOUNTER
Pt cancelled office visit with Dr. Franco on 1/25/24.  Pt cancelled via my chart. Work school.     Okay to reschedule.

## 2024-02-20 ENCOUNTER — OFFICE VISIT (OUTPATIENT)
Dept: PULMONOLOGY | Age: 34
End: 2024-02-20
Payer: COMMERCIAL

## 2024-02-20 VITALS
RESPIRATION RATE: 16 BRPM | DIASTOLIC BLOOD PRESSURE: 74 MMHG | BODY MASS INDEX: 40.32 KG/M2 | OXYGEN SATURATION: 95 % | HEART RATE: 88 BPM | WEIGHT: 200 LBS | HEIGHT: 59 IN | SYSTOLIC BLOOD PRESSURE: 122 MMHG

## 2024-02-20 DIAGNOSIS — G47.00 INSOMNIA, UNSPECIFIED TYPE: ICD-10-CM

## 2024-02-20 DIAGNOSIS — Z72.821 POOR SLEEP HYGIENE: ICD-10-CM

## 2024-02-20 DIAGNOSIS — G43.719 INTRACTABLE CHRONIC MIGRAINE WITHOUT AURA AND WITHOUT STATUS MIGRAINOSUS: ICD-10-CM

## 2024-02-20 DIAGNOSIS — R53.82 CHRONIC FATIGUE: ICD-10-CM

## 2024-02-20 DIAGNOSIS — G47.10 HYPERSOMNIA: ICD-10-CM

## 2024-02-20 DIAGNOSIS — R06.83 SNORING: Primary | ICD-10-CM

## 2024-02-20 PROBLEM — S63.501A SPRAIN OF RIGHT WRIST: Status: RESOLVED | Noted: 2018-09-27 | Resolved: 2024-02-20

## 2024-02-20 PROCEDURE — G8427 DOCREV CUR MEDS BY ELIG CLIN: HCPCS

## 2024-02-20 PROCEDURE — 99204 OFFICE O/P NEW MOD 45 MIN: CPT

## 2024-02-20 PROCEDURE — G8484 FLU IMMUNIZE NO ADMIN: HCPCS

## 2024-02-20 PROCEDURE — G8417 CALC BMI ABV UP PARAM F/U: HCPCS

## 2024-02-20 PROCEDURE — 1036F TOBACCO NON-USER: CPT

## 2024-02-20 ASSESSMENT — SLEEP AND FATIGUE QUESTIONNAIRES
HOW LIKELY ARE YOU TO NOD OFF OR FALL ASLEEP WHILE WATCHING TV: 2
HOW LIKELY ARE YOU TO NOD OFF OR FALL ASLEEP WHILE LYING DOWN TO REST IN THE AFTERNOON WHEN CIRCUMSTANCES PERMIT: 3
HOW LIKELY ARE YOU TO NOD OFF OR FALL ASLEEP IN A CAR, WHILE STOPPED FOR A FEW MINUTES IN TRAFFIC: 2
HOW LIKELY ARE YOU TO NOD OFF OR FALL ASLEEP WHILE SITTING AND TALKING TO SOMEONE: 0
HOW LIKELY ARE YOU TO NOD OFF OR FALL ASLEEP WHILE SITTING QUIETLY AFTER LUNCH WITHOUT ALCOHOL: 2
ESS TOTAL SCORE: 14
HOW LIKELY ARE YOU TO NOD OFF OR FALL ASLEEP WHEN YOU ARE A PASSENGER IN A CAR FOR AN HOUR WITHOUT A BREAK: 3
HOW LIKELY ARE YOU TO NOD OFF OR FALL ASLEEP WHILE SITTING INACTIVE IN A PUBLIC PLACE: 0
HOW LIKELY ARE YOU TO NOD OFF OR FALL ASLEEP WHILE SITTING AND READING: 2
NECK CIRCUMFERENCE (INCHES): 13.5

## 2024-02-20 NOTE — PROGRESS NOTES
PULMONARY, CRITICAL CARE AND SLEEP MEDICINE   Outpatient Sleep Consult Note  CC: Snoring  Referring Provider: Patient is being seen at the request of Shelia Griffith CNP, for a consultation to evaluate for Obstructive Sleep Apnea.    Presenting HPI 2/20/24:  32 yo female struggling with fatigue and daytime sleepiness since around October 2022.  She is noticing that she is needing more sleep in order to function the same amount.  Of note, she experienced the death of someone close to her in July of 2022 but does not suspect that it is related and reports she has never struggled with depression since this.  She has continued to feel tired all the time, weak and gets frequent headaches/migraines.  Was sleeping 8-10 hours and still feeling tired.  She does snore nightly, but no associated witnessed apneas.  + AM headaches.  No treatments tried so far & has not been evaluated for sleep apnea in the past.  Routine is relatively inconsistent as two days a week she works double shifts and pulls a night shift as well, but she has done this for years and has historically always tolerated well. She now gets ~ 6-8 hrs of sleep per night; bedtime MN-1a & rise time 6:30 am to get her kids ready for school.  She then goes back to sleep around 9 am for an hour before leaving for work ~10:30 am.  On nights she is unable to get to sleep she watches TV and/or will play on her phone until typically falling asleep at 2-3a.  To restroom infrequently x/night.  If she does wake up in the middle of the night it's often difficult to get back to sleep and she watches TV.  Takes a daytime nap about 2x a week if she can find the time, but often wakes up feeling worse from them.  Sometimes this [place] is 14.  No car wrecks/near wrecks because of the sleepiness or nodding off while driving.  Weight: gained 20 lbs over 1 yr.  Drinks \"a lot\" of caffinated beverages/day.  Never smoker.  No known family history of JUHI.     reports that she has never

## 2024-02-20 NOTE — PATIENT INSTRUCTIONS
What's next:  Schedule a study with the sleep lab (call them at [phone] if you do not receive their call.)    Read through the sleep hygiene tips below to see what kind of changes you can start working on in the meantime, while awaiting your sleep study.    We will meet after your sleep study to discuss results, options and recommendations from there.       It was great to meet you and take care Sima!  -Kim    ______________________________________________________________________  Never drive a car or operate a motorized vehicle while drowsy or sleepy.   ______________________________________________________________________        Sleep Hygiene... Important practices for better sleep:    Avoid naps. This will ensure you are sleepy at bedtime.  If you have to take a nap, sleep less than 1 hour, before 3 pm.  SCHEDULE: Have a fixed bedtime and awakening time. The human body thrives on routines. Only deviate from these set sleep times about 1-2 hours on the weekends (more than this will start altering your internal clock). You will feel better keeping a regular sleep cycle and giving your body a dependable pattern, even (especially) if you are retired or not working.   Use light to train your biological clock: When you get up in the morning, exposure yourself to bright lights. When preparing for bed, dim the lights and avoid exposure to screens.  The blue light from electronic screens tells our brain that it is time to be awake; it inhibits melatonin production which stops our brain from helping us get to sleep.   Go to bed only when sleepy; this reduces the time you are awake in bed (which can lead to frustration and negative thoughts about sleep). If you can't fall asleep within 15-30 minutes, get up and do something boring until you feel sleepy again. Sit quietly in the dark or read the warranty on your refrigerator. Don't expose yourself to bright light during this time (especially screens), which

## 2024-04-11 ENCOUNTER — TELEPHONE (OUTPATIENT)
Dept: PULMONOLOGY | Age: 34
End: 2024-04-11

## 2024-04-11 NOTE — TELEPHONE ENCOUNTER
Called [phone] (home)   Spoke w/ pt and informed of upcoming appt on 04-23-24. I asked pt if she still was interested in psg. Pt stated she had a lot going on and all of her children were sick. She wants to have study done. I informed pt I would re-send orders and sleep center will reach out to her. Pt voiced understanding.     04-23-24 appt cancelled.

## 2024-04-30 ENCOUNTER — HOSPITAL ENCOUNTER (EMERGENCY)
Age: 34
Discharge: HOME | End: 2024-04-30
Attending: EMERGENCY MEDICINE
Payer: COMMERCIAL

## 2024-04-30 ENCOUNTER — APPOINTMENT (OUTPATIENT)
Dept: GENERAL RADIOLOGY | Age: 34
End: 2024-04-30
Payer: COMMERCIAL

## 2024-04-30 VITALS
RESPIRATION RATE: 16 BRPM | DIASTOLIC BLOOD PRESSURE: 83 MMHG | TEMPERATURE: 97.9 F | HEART RATE: 72 BPM | BODY MASS INDEX: 44.01 KG/M2 | SYSTOLIC BLOOD PRESSURE: 122 MMHG | OXYGEN SATURATION: 99 % | WEIGHT: 218.3 LBS | HEIGHT: 59 IN

## 2024-04-30 DIAGNOSIS — S99.922A FOOT INJURY, LEFT, INITIAL ENCOUNTER: Primary | ICD-10-CM

## 2024-04-30 PROCEDURE — 73610 X-RAY EXAM OF ANKLE: CPT

## 2024-04-30 PROCEDURE — 6370000000 HC RX 637 (ALT 250 FOR IP): Performed by: EMERGENCY MEDICINE

## 2024-04-30 PROCEDURE — 73630 X-RAY EXAM OF FOOT: CPT

## 2024-04-30 PROCEDURE — 99283 EMERGENCY DEPT VISIT LOW MDM: CPT

## 2024-04-30 RX ORDER — ACETAMINOPHEN 325 MG/1
650 TABLET ORAL ONCE
Status: COMPLETED | OUTPATIENT
Start: 2024-04-30 | End: 2024-04-30

## 2024-04-30 RX ORDER — NORETHINDRONE ACETATE AND ETHINYL ESTRADIOL 1MG-20(24)
KIT ORAL
COMMUNITY
Start: 2024-03-28

## 2024-04-30 RX ADMIN — ACETAMINOPHEN 650 MG: 325 TABLET ORAL at 10:37

## 2024-04-30 ASSESSMENT — ENCOUNTER SYMPTOMS
NAUSEA: 0
SHORTNESS OF BREATH: 0
TROUBLE SWALLOWING: 0
DIARRHEA: 0
VOMITING: 0
VOICE CHANGE: 0

## 2024-04-30 ASSESSMENT — PAIN DESCRIPTION - ORIENTATION: ORIENTATION: LEFT

## 2024-04-30 ASSESSMENT — PAIN DESCRIPTION - DESCRIPTORS: DESCRIPTORS: SHARP

## 2024-04-30 ASSESSMENT — PAIN DESCRIPTION - LOCATION: LOCATION: FOOT

## 2024-04-30 ASSESSMENT — PAIN SCALES - GENERAL: PAINLEVEL_OUTOF10: 5

## 2024-04-30 ASSESSMENT — PAIN - FUNCTIONAL ASSESSMENT: PAIN_FUNCTIONAL_ASSESSMENT: 0-10

## 2024-04-30 NOTE — ED PROVIDER NOTES
[facility] EMERGENCY DEPARTMENT  eMERGENCY dEPARTMENT eNCOUnter      Pt Name: Sima Banks  MRN: [id number]  Birthdate 1990  Date of evaluation: 4/30/2024  Provider: Daniel Hodge MD    CHIEF COMPLAINT       Chief Complaint   Patient presents with    Foot Pain     Pt states that she stepped on a rock this am and states that she slipped and fell onto the outside of her foot/ankle. Pt states that she is able to walk on her foot by putting pressure on the inside. Pt states that she heard a crack when she slipped.     HISTORY OF PRESENT ILLNESS   (Location/Symptom, Timing/Onset, Context/Setting, Quality, Duration, Modifying Factors, Severity)  Note limiting factors.     History obtained from: the patient     Sima Banks is a 33 y.o. female who reports with neck pain.  Patient reports that she stepped on a rock this morning and her foot slid off the rock awkwardly and she heard a crack.  Patient denies any head neck or back injury or pain but does report pain to her left lateral foot and ankle.  Patient reports she can bear weight but only if she only puts weight on her knee and foot depending weight on her lateral foot causes significant discomfort.  Denies any numbness or weakness.      HPI    Nursing Notes were reviewed.    REVIEW OFSYSTEMS    (2-9 systems for level 4, 10 or more for level 5)     Review of Systems   Constitutional:  Negative for appetite change and fever.   HENT:  Negative for trouble swallowing and voice change.    Eyes:  Negative for visual disturbance.   Respiratory:  Negative for shortness of breath.    Cardiovascular:  Negative for chest pain and palpitations.   Gastrointestinal:  Negative for diarrhea, nausea and vomiting.   Genitourinary:  Negative for dysuria.   Musculoskeletal:  Positive for arthralgias. Negative for gait problem.   Neurological:  Negative for seizures and syncope.   Psychiatric/Behavioral:  Negative for self-injury and suicidal ideas.        Except as noted above

## 2024-08-21 ENCOUNTER — HOSPITAL ENCOUNTER (EMERGENCY)
Age: 34
Discharge: HOME | End: 2024-08-21
Attending: INTERNAL MEDICINE
Payer: COMMERCIAL

## 2024-08-21 ENCOUNTER — APPOINTMENT (OUTPATIENT)
Dept: CT IMAGING | Age: 34
End: 2024-08-21
Payer: COMMERCIAL

## 2024-08-21 VITALS
HEIGHT: 59 IN | BODY MASS INDEX: 44.51 KG/M2 | DIASTOLIC BLOOD PRESSURE: 70 MMHG | WEIGHT: 220.8 LBS | TEMPERATURE: 98.3 F | SYSTOLIC BLOOD PRESSURE: 120 MMHG | OXYGEN SATURATION: 100 % | HEART RATE: 78 BPM | RESPIRATION RATE: 18 BRPM

## 2024-08-21 DIAGNOSIS — R10.9 ACUTE RIGHT FLANK PAIN: Primary | ICD-10-CM

## 2024-08-21 LAB
ALBUMIN SERPL-MCNC: 4.1 G/DL (ref 3.4–5)
ALP SERPL-CCNC: 71 U/L (ref 40–129)
ALT SERPL-CCNC: 13 U/L (ref 10–40)
ANION GAP SERPL CALCULATED.3IONS-SCNC: 10 MMOL/L (ref 3–16)
AST SERPL-CCNC: 13 U/L (ref 15–37)
BACTERIA URNS QL MICRO: ABNORMAL /HPF
BASOPHILS # BLD: 0 K/UL (ref 0–0.2)
BASOPHILS NFR BLD: 0.3 %
BILIRUB DIRECT SERPL-MCNC: 0.2 MG/DL (ref 0–0.3)
BILIRUB INDIRECT SERPL-MCNC: ABNORMAL MG/DL (ref 0–1)
BILIRUB SERPL-MCNC: <0.2 MG/DL (ref 0–1)
BILIRUB UR QL STRIP.AUTO: NEGATIVE
BUN SERPL-MCNC: 16 MG/DL (ref 7–20)
CALCIUM SERPL-MCNC: 9 MG/DL (ref 8.3–10.6)
CHLORIDE SERPL-SCNC: 105 MMOL/L (ref 99–110)
CLARITY UR: ABNORMAL
CO2 SERPL-SCNC: 24 MMOL/L (ref 21–32)
COLOR UR: YELLOW
CREAT SERPL-MCNC: 0.6 MG/DL (ref 0.6–1.1)
DEPRECATED RDW RBC AUTO: 15.2 % (ref 12.4–15.4)
EOSINOPHIL # BLD: 0.1 K/UL (ref 0–0.6)
EOSINOPHIL NFR BLD: 2.4 %
EPI CELLS #/AREA URNS HPF: ABNORMAL /HPF (ref 0–5)
GFR SERPLBLD CREATININE-BSD FMLA CKD-EPI: >90 ML/MIN/{1.73_M2}
GLUCOSE SERPL-MCNC: 149 MG/DL (ref 70–99)
GLUCOSE UR STRIP.AUTO-MCNC: NEGATIVE MG/DL
HCG UR QL: NEGATIVE
HCT VFR BLD AUTO: 36.5 % (ref 36–48)
HGB BLD-MCNC: 11.7 G/DL (ref 12–16)
HGB UR QL STRIP.AUTO: ABNORMAL
KETONES UR STRIP.AUTO-MCNC: NEGATIVE MG/DL
LEUKOCYTE ESTERASE UR QL STRIP.AUTO: NEGATIVE
LIPASE SERPL-CCNC: 23 U/L (ref 13–60)
LYMPHOCYTES # BLD: 1.9 K/UL (ref 1–5.1)
LYMPHOCYTES NFR BLD: 36.7 %
MCH RBC QN AUTO: 26.1 PG (ref 26–34)
MCHC RBC AUTO-ENTMCNC: 32.1 G/DL (ref 31–36)
MCV RBC AUTO: 81.4 FL (ref 80–100)
MONOCYTES # BLD: 0.3 K/UL (ref 0–1.3)
MONOCYTES NFR BLD: 5.5 %
MUCOUS THREADS #/AREA URNS LPF: ABNORMAL /LPF
NEUTROPHILS # BLD: 2.9 K/UL (ref 1.7–7.7)
NEUTROPHILS NFR BLD: 55.1 %
NITRITE UR QL STRIP.AUTO: NEGATIVE
PH UR STRIP.AUTO: 5.5 [PH] (ref 5–8)
PLATELET # BLD AUTO: 289 K/UL (ref 135–450)
PMV BLD AUTO: 8.1 FL (ref 5–10.5)
POTASSIUM SERPL-SCNC: 4 MMOL/L (ref 3.5–5.1)
PROT SERPL-MCNC: 7.1 G/DL (ref 6.4–8.2)
PROT UR STRIP.AUTO-MCNC: NEGATIVE MG/DL
RBC # BLD AUTO: 4.48 M/UL (ref 4–5.2)
RBC #/AREA URNS HPF: ABNORMAL /HPF (ref 0–4)
SODIUM SERPL-SCNC: 139 MMOL/L (ref 136–145)
SP GR UR STRIP.AUTO: >=1.03 (ref 1–1.03)
UA COMPLETE W REFLEX CULTURE PNL UR: ABNORMAL
UA DIPSTICK W REFLEX MICRO PNL UR: YES
URN SPEC COLLECT METH UR: ABNORMAL
UROBILINOGEN UR STRIP-ACNC: 0.2 E.U./DL
WBC # BLD AUTO: 5.3 K/UL (ref 4–11)
WBC #/AREA URNS HPF: ABNORMAL /HPF (ref 0–5)

## 2024-08-21 PROCEDURE — 80076 HEPATIC FUNCTION PANEL: CPT

## 2024-08-21 PROCEDURE — 80048 BASIC METABOLIC PNL TOTAL CA: CPT

## 2024-08-21 PROCEDURE — 85025 COMPLETE CBC W/AUTO DIFF WBC: CPT

## 2024-08-21 PROCEDURE — 96374 THER/PROPH/DIAG INJ IV PUSH: CPT

## 2024-08-21 PROCEDURE — 36415 COLL VENOUS BLD VENIPUNCTURE: CPT

## 2024-08-21 PROCEDURE — 74176 CT ABD & PELVIS W/O CONTRAST: CPT

## 2024-08-21 PROCEDURE — 83690 ASSAY OF LIPASE: CPT

## 2024-08-21 PROCEDURE — 99284 EMERGENCY DEPT VISIT MOD MDM: CPT

## 2024-08-21 PROCEDURE — 81001 URINALYSIS AUTO W/SCOPE: CPT

## 2024-08-21 PROCEDURE — 96375 TX/PRO/DX INJ NEW DRUG ADDON: CPT

## 2024-08-21 PROCEDURE — 84703 CHORIONIC GONADOTROPIN ASSAY: CPT

## 2024-08-21 PROCEDURE — 6360000002 HC RX W HCPCS: Performed by: INTERNAL MEDICINE

## 2024-08-21 RX ORDER — FENTANYL CITRATE 50 UG/ML
25 INJECTION, SOLUTION INTRAMUSCULAR; INTRAVENOUS ONCE
Status: COMPLETED | OUTPATIENT
Start: 2024-08-21 | End: 2024-08-21

## 2024-08-21 RX ORDER — METHOCARBAMOL 500 MG/1
500 TABLET, FILM COATED ORAL 4 TIMES DAILY
Qty: 20 TABLET | Refills: 0 | Status: SHIPPED | OUTPATIENT
Start: 2024-08-21 | End: 2024-08-26

## 2024-08-21 RX ORDER — ONDANSETRON 2 MG/ML
4 INJECTION INTRAMUSCULAR; INTRAVENOUS ONCE
Status: COMPLETED | OUTPATIENT
Start: 2024-08-21 | End: 2024-08-21

## 2024-08-21 RX ORDER — ACETAMINOPHEN 325 MG/1
650 TABLET ORAL EVERY 6 HOURS PRN
COMMUNITY

## 2024-08-21 RX ORDER — IBUPROFEN 200 MG
200 TABLET ORAL EVERY 6 HOURS PRN
COMMUNITY

## 2024-08-21 RX ADMIN — ONDANSETRON 4 MG: 2 INJECTION INTRAMUSCULAR; INTRAVENOUS at 10:03

## 2024-08-21 RX ADMIN — FENTANYL CITRATE 25 MCG: 50 INJECTION INTRAMUSCULAR; INTRAVENOUS at 10:03

## 2024-08-21 ASSESSMENT — PAIN - FUNCTIONAL ASSESSMENT
PAIN_FUNCTIONAL_ASSESSMENT: 0-10
PAIN_FUNCTIONAL_ASSESSMENT: NONE - DENIES PAIN

## 2024-08-21 ASSESSMENT — LIFESTYLE VARIABLES
HOW MANY STANDARD DRINKS CONTAINING ALCOHOL DO YOU HAVE ON A TYPICAL DAY: PATIENT DOES NOT DRINK
HOW OFTEN DO YOU HAVE A DRINK CONTAINING ALCOHOL: NEVER

## 2024-08-21 ASSESSMENT — PAIN DESCRIPTION - LOCATION: LOCATION: FLANK

## 2024-08-21 ASSESSMENT — PAIN SCALES - GENERAL
PAINLEVEL_OUTOF10: 6
PAINLEVEL_OUTOF10: 9

## 2024-08-21 ASSESSMENT — PAIN DESCRIPTION - ORIENTATION: ORIENTATION: RIGHT

## 2024-08-21 ASSESSMENT — PAIN DESCRIPTION - FREQUENCY: FREQUENCY: INTERMITTENT

## 2024-08-21 ASSESSMENT — PAIN DESCRIPTION - DESCRIPTORS: DESCRIPTORS: SHARP

## 2024-08-21 NOTE — ED PROVIDER NOTES
EMERGENCY MEDICINE PROVIDER NOTE    Patient Identification  Pt Name: Sima Banks  MRN: [id number]  Birthdate 1990  Date of evaluation: 2024  Provider: BECKI OTTO DO  PCP: Shelia Griffith APRN - CNP    Chief Complaint  Flank Pain (C/o R sided flank pain x approx 4 days with 1 episode of N/V & fever yesterday. States she was seen @ Urgent Care 3 days ago for similar s/s)      HPI  (History provided by patient)  This is a 33 y.o. female who was brought in by self for right flank pain for the past 4 days.  Patient does have history of nausea and vomiting and a fever yesterday.  She did not measure fever.  Patient was seen at urgent care 3 days ago.  She was tested for strep throat and COVID and they were both found to be negative.  Patient states the pain is on the right posterior flank area.  Some the pain is reproduced with movement.  She denies hematuria and dysuria.  She does not have any lower abdominal pain.    I have reviewed the following nursing documentation:  Allergies: Toradol [ketorolac tromethamine], Tramadol, and Vicodin [hydrocodone-acetaminophen]    Past medical history:   Past Medical History:   Diagnosis Date    Abnormal Pap smear of cervix     normal since    Anemia     after previous     Chlamydia     Remote    Gestational diabetes     MRSA (methicillin resistant staph aureus) culture positive 10/20/2017    C section  - abdomen    Wrist fracture age 4    right wrist; fell off [employer]     Past surgical history:   Past Surgical History:   Procedure Laterality Date     SECTION, LOW TRANSVERSE  2006, 2008, , ,     hx 5 C/S    CHOLECYSTECTOMY  2009    TUBAL LIGATION      WISDOM TOOTH EXTRACTION  2010       Home medications:   Discharge Medication List as of 2024 10:29 AM        CONTINUE these medications which have NOT CHANGED    Details   acetaminophen (TYLENOL) 325 MG tablet Take 2 tablets by mouth every 6 hours as needed for [facility]

## 2024-09-30 PROBLEM — E66.01 MORBID OBESITY: Status: ACTIVE | Noted: 2024-09-21

## 2024-09-30 PROBLEM — Z98.51 HISTORY OF TUBAL LIGATION: Status: ACTIVE | Noted: 2024-09-21

## 2024-09-30 PROBLEM — N92.0 MENORRHAGIA: Status: ACTIVE | Noted: 2024-09-24

## 2024-10-01 ENCOUNTER — INITIAL CONSULT (OUTPATIENT)
Dept: SURGERY | Age: 34
End: 2024-10-01
Payer: COMMERCIAL

## 2024-10-01 VITALS
HEIGHT: 59 IN | DIASTOLIC BLOOD PRESSURE: 76 MMHG | WEIGHT: 196.2 LBS | BODY MASS INDEX: 39.55 KG/M2 | SYSTOLIC BLOOD PRESSURE: 126 MMHG

## 2024-10-01 DIAGNOSIS — R10.31 RLQ ABDOMINAL PAIN: Primary | ICD-10-CM

## 2024-10-01 PROCEDURE — 1036F TOBACCO NON-USER: CPT | Performed by: SURGERY

## 2024-10-01 PROCEDURE — G8427 DOCREV CUR MEDS BY ELIG CLIN: HCPCS | Performed by: SURGERY

## 2024-10-01 PROCEDURE — 99203 OFFICE O/P NEW LOW 30 MIN: CPT | Performed by: SURGERY

## 2024-10-01 PROCEDURE — G8484 FLU IMMUNIZE NO ADMIN: HCPCS | Performed by: SURGERY

## 2024-10-01 PROCEDURE — G8417 CALC BMI ABV UP PARAM F/U: HCPCS | Performed by: SURGERY

## 2024-10-01 RX ORDER — TRANEXAMIC ACID 650 MG/1
TABLET ORAL
COMMUNITY
Start: 2024-09-24

## 2024-10-01 NOTE — PROGRESS NOTES
file   Occupational History    Not on file   Tobacco Use    Smoking status: Never     Passive exposure: Never    Smokeless tobacco: Never   Vaping Use    Vaping status: Never Used   Substance and Sexual Activity    Alcohol use: No    Drug use: No    Sexual activity: Yes     Partners: Male   Other Topics Concern    Not on file   Social History Narrative    Not on file     Social Determinants of Health     Financial Resource Strain: Not on file   Food Insecurity: Not on file   Transportation Needs: Not on file   Physical Activity: Not on file   Stress: Not on file   Social Connections: Not on file   Intimate Partner Violence: Not on file   Housing Stability: Not on file          Vitals:    10/01/24 0952   BP: 126/76   Site: Left Upper Arm   Position: Sitting   Cuff Size: Large Adult   Weight: 89 kg (196 lb 3.2 oz)   Height: 1.499 m (4' 11\")     Body mass index is 39.63 kg/m².     Wt Readings from Last 3 Encounters:   10/01/24 89 kg (196 lb 3.2 oz)   24 100.2 kg (220 lb 12.8 oz)   24 99 kg (218 lb 4.8 oz)     BP Readings from Last 3 Encounters:   10/01/24 126/76   24 120/70   24 122/83        REVIEW OF SYSTEMS:  CONSTITUTIONAL:  negative  HEENT:  Negative  RESPIRATORY:  negative  CARDIOVASCULAR:  negative  GASTROINTESTINAL:  positive for abdominal pain  GENITOURINARY:  negative  HEMATOLOGIC/LYMPHATIC:  negative  ENDOCRINE:  Negative  NEUROLOGICAL:  Negative  * All other ROS reviewed and negative.     PE:  Constitutional:  Well developed, well nourished, no acute distress, non-toxic appearance   Eyes:  PERRL, conjunctiva normal   HENT:  Atraumatic, external ears normal, nose normal. Neck- normal range of motion, no tenderness, supple   Respiratory:  No respiratory distress, normal breath sounds, no rales, no wheezing   Cardiovascular:  Normal rate, normal rhythm  GI: Bowel sounds positive, soft, mild tenderness in the right lower quadrant at the lateral aspect of her  incision.  No

## 2025-09-02 ENCOUNTER — HOSPITAL ENCOUNTER (EMERGENCY)
Age: 35
Discharge: HOME | End: 2025-09-02
Attending: STUDENT IN AN ORGANIZED HEALTH CARE EDUCATION/TRAINING PROGRAM
Payer: COMMERCIAL

## 2025-09-02 ENCOUNTER — APPOINTMENT (OUTPATIENT)
Dept: GENERAL RADIOLOGY | Age: 35
End: 2025-09-02
Payer: COMMERCIAL

## 2025-09-02 VITALS
SYSTOLIC BLOOD PRESSURE: 136 MMHG | HEART RATE: 73 BPM | TEMPERATURE: 97.9 F | RESPIRATION RATE: 16 BRPM | BODY MASS INDEX: 45.28 KG/M2 | DIASTOLIC BLOOD PRESSURE: 63 MMHG | WEIGHT: 224.2 LBS | OXYGEN SATURATION: 99 %

## 2025-09-02 DIAGNOSIS — R07.9 CHEST PAIN, UNSPECIFIED TYPE: Primary | ICD-10-CM

## 2025-09-02 DIAGNOSIS — R42 INTERMITTENT LIGHTHEADEDNESS: ICD-10-CM

## 2025-09-02 DIAGNOSIS — Z77.098 EXPOSURE TO CHEMICAL INHALATION: ICD-10-CM

## 2025-09-02 LAB
ALBUMIN SERPL-MCNC: 3.8 G/DL (ref 3.4–5)
ALBUMIN/GLOB SERPL: 1.2 {RATIO} (ref 1.1–2.2)
ALP SERPL-CCNC: 67 U/L (ref 40–129)
ALT SERPL-CCNC: 14 U/L (ref 10–40)
ANION GAP SERPL CALCULATED.3IONS-SCNC: 11 MMOL/L (ref 3–16)
AST SERPL-CCNC: 12 U/L (ref 15–37)
BASE EXCESS BLDV CALC-SCNC: -1 MMOL/L (ref -3–3)
BASOPHILS # BLD: 0 K/UL (ref 0–0.2)
BASOPHILS NFR BLD: 0.3 %
BILIRUB SERPL-MCNC: <0.2 MG/DL (ref 0–1)
BUN SERPL-MCNC: 17 MG/DL (ref 7–20)
CALCIUM SERPL-MCNC: 9.5 MG/DL (ref 8.3–10.6)
CHLORIDE SERPL-SCNC: 106 MMOL/L (ref 99–110)
CO2 BLDV-SCNC: 26 MMOL/L
CO2 SERPL-SCNC: 25 MMOL/L (ref 21–32)
COHGB MFR BLDV: 1.5 % (ref 0–1.5)
CREAT SERPL-MCNC: 0.6 MG/DL (ref 0.6–1.1)
D-DIMER QUANTITATIVE: <0.27 UG/ML FEU (ref 0–0.6)
DEPRECATED RDW RBC AUTO: 16.2 % (ref 12.4–15.4)
EKG ATRIAL RATE: 73 BPM
EKG DIAGNOSIS: NORMAL
EKG P AXIS: 23 DEGREES
EKG P-R INTERVAL: 148 MS
EKG Q-T INTERVAL: 378 MS
EKG QRS DURATION: 88 MS
EKG QTC CALCULATION (BAZETT): 416 MS
EKG R AXIS: 8 DEGREES
EKG T AXIS: 16 DEGREES
EKG VENTRICULAR RATE: 73 BPM
EOSINOPHIL # BLD: 0.1 K/UL (ref 0–0.6)
EOSINOPHIL NFR BLD: 1.8 %
GFR SERPLBLD CREATININE-BSD FMLA CKD-EPI: >90 ML/MIN/{1.73_M2}
GLUCOSE SERPL-MCNC: 99 MG/DL (ref 70–99)
HCG SERPL QL: NEGATIVE
HCO3 BLDV-SCNC: 24.8 MMOL/L (ref 23–29)
HCT VFR BLD AUTO: 35.6 % (ref 36–48)
HGB BLD-MCNC: 11.7 G/DL (ref 12–16)
LYMPHOCYTES # BLD: 2.3 K/UL (ref 1–5.1)
LYMPHOCYTES NFR BLD: 34 %
MCH RBC QN AUTO: 25.9 PG (ref 26–34)
MCHC RBC AUTO-ENTMCNC: 32.7 G/DL (ref 31–36)
MCV RBC AUTO: 79 FL (ref 80–100)
METHGB MFR BLDV: 0.3 %
MONOCYTES # BLD: 0.5 K/UL (ref 0–1.3)
MONOCYTES NFR BLD: 6.7 %
NEUTROPHILS # BLD: 3.9 K/UL (ref 1.7–7.7)
NEUTROPHILS NFR BLD: 57.2 %
NT-PROBNP SERPL-MCNC: 109 PG/ML (ref 0–124)
O2 THERAPY: ABNORMAL
PCO2 BLDV: 45.7 MMHG (ref 40–50)
PH BLDV: 7.35 [PH] (ref 7.35–7.45)
PLATELET # BLD AUTO: 296 K/UL (ref 135–450)
PMV BLD AUTO: 7.9 FL (ref 5–10.5)
PO2 BLDV: 51.7 MMHG (ref 25–40)
POTASSIUM SERPL-SCNC: 4.1 MMOL/L (ref 3.5–5.1)
PROT SERPL-MCNC: 7.1 G/DL (ref 6.4–8.2)
RBC # BLD AUTO: 4.51 M/UL (ref 4–5.2)
SAO2 % BLDV: 85 %
SODIUM SERPL-SCNC: 142 MMOL/L (ref 136–145)
TROPONIN, HIGH SENSITIVITY: <6 NG/L (ref 0–14)
TROPONIN, HIGH SENSITIVITY: <6 NG/L (ref 0–14)
WBC # BLD AUTO: 6.8 K/UL (ref 4–11)

## 2025-09-02 PROCEDURE — 6370000000 HC RX 637 (ALT 250 FOR IP): Performed by: STUDENT IN AN ORGANIZED HEALTH CARE EDUCATION/TRAINING PROGRAM

## 2025-09-02 PROCEDURE — 93005 ELECTROCARDIOGRAM TRACING: CPT | Performed by: STUDENT IN AN ORGANIZED HEALTH CARE EDUCATION/TRAINING PROGRAM

## 2025-09-02 PROCEDURE — 71046 X-RAY EXAM CHEST 2 VIEWS: CPT

## 2025-09-02 PROCEDURE — 83880 ASSAY OF NATRIURETIC PEPTIDE: CPT

## 2025-09-02 PROCEDURE — 99285 EMERGENCY DEPT VISIT HI MDM: CPT

## 2025-09-02 PROCEDURE — 84703 CHORIONIC GONADOTROPIN ASSAY: CPT

## 2025-09-02 PROCEDURE — 85025 COMPLETE CBC W/AUTO DIFF WBC: CPT

## 2025-09-02 PROCEDURE — 80053 COMPREHEN METABOLIC PANEL: CPT

## 2025-09-02 PROCEDURE — 82803 BLOOD GASES ANY COMBINATION: CPT

## 2025-09-02 PROCEDURE — 93010 ELECTROCARDIOGRAM REPORT: CPT | Performed by: INTERNAL MEDICINE

## 2025-09-02 PROCEDURE — 84484 ASSAY OF TROPONIN QUANT: CPT

## 2025-09-02 PROCEDURE — 36415 COLL VENOUS BLD VENIPUNCTURE: CPT

## 2025-09-02 PROCEDURE — 85379 FIBRIN DEGRADATION QUANT: CPT

## 2025-09-02 RX ORDER — ACETAMINOPHEN 500 MG
1000 TABLET ORAL ONCE
Status: COMPLETED | OUTPATIENT
Start: 2025-09-02 | End: 2025-09-02

## 2025-09-02 RX ADMIN — ACETAMINOPHEN 1000 MG: 500 TABLET ORAL at 15:08

## 2025-09-02 ASSESSMENT — PAIN - FUNCTIONAL ASSESSMENT: PAIN_FUNCTIONAL_ASSESSMENT: 0-10

## 2025-09-02 ASSESSMENT — PAIN SCALES - GENERAL: PAINLEVEL_OUTOF10: 6

## 2025-09-02 ASSESSMENT — PAIN DESCRIPTION - DESCRIPTORS: DESCRIPTORS: DULL

## 2025-09-02 ASSESSMENT — PAIN DESCRIPTION - ORIENTATION: ORIENTATION: MID

## 2025-09-02 ASSESSMENT — PAIN DESCRIPTION - LOCATION: LOCATION: CHEST

## 2025-09-02 ASSESSMENT — PAIN DESCRIPTION - PAIN TYPE: TYPE: ACUTE PAIN
